# Patient Record
Sex: MALE | Race: WHITE | NOT HISPANIC OR LATINO | Employment: FULL TIME | URBAN - METROPOLITAN AREA
[De-identification: names, ages, dates, MRNs, and addresses within clinical notes are randomized per-mention and may not be internally consistent; named-entity substitution may affect disease eponyms.]

---

## 2021-08-17 ENCOUNTER — LAB VISIT (OUTPATIENT)
Dept: PRIMARY CARE CLINIC | Facility: OTHER | Age: 21
End: 2021-08-17
Payer: OTHER GOVERNMENT

## 2021-08-17 DIAGNOSIS — J02.9 SORE THROAT: ICD-10-CM

## 2021-08-17 PROCEDURE — U0003 INFECTIOUS AGENT DETECTION BY NUCLEIC ACID (DNA OR RNA); SEVERE ACUTE RESPIRATORY SYNDROME CORONAVIRUS 2 (SARS-COV-2) (CORONAVIRUS DISEASE [COVID-19]), AMPLIFIED PROBE TECHNIQUE, MAKING USE OF HIGH THROUGHPUT TECHNOLOGIES AS DESCRIBED BY CMS-2020-01-R: HCPCS | Performed by: INTERNAL MEDICINE

## 2021-08-19 LAB
SARS-COV-2 RNA RESP QL NAA+PROBE: NOT DETECTED
SARS-COV-2- CYCLE NUMBER: -1

## 2022-11-28 ENCOUNTER — OFFICE VISIT (OUTPATIENT)
Dept: FAMILY MEDICINE | Facility: CLINIC | Age: 22
End: 2022-11-28
Payer: OTHER GOVERNMENT

## 2022-11-28 VITALS
WEIGHT: 145 LBS | HEIGHT: 68 IN | BODY MASS INDEX: 21.98 KG/M2 | SYSTOLIC BLOOD PRESSURE: 122 MMHG | DIASTOLIC BLOOD PRESSURE: 71 MMHG | HEART RATE: 62 BPM

## 2022-11-28 DIAGNOSIS — K21.9 GASTROESOPHAGEAL REFLUX DISEASE, UNSPECIFIED WHETHER ESOPHAGITIS PRESENT: ICD-10-CM

## 2022-11-28 DIAGNOSIS — F33.1 MODERATE EPISODE OF RECURRENT MAJOR DEPRESSIVE DISORDER: ICD-10-CM

## 2022-11-28 DIAGNOSIS — Z23 FLU VACCINE NEED: ICD-10-CM

## 2022-11-28 DIAGNOSIS — Z00.00 ROUTINE PHYSICAL EXAMINATION: Primary | ICD-10-CM

## 2022-11-28 PROCEDURE — 99385 PREV VISIT NEW AGE 18-39: CPT | Mod: 25,S$GLB,, | Performed by: PHYSICIAN ASSISTANT

## 2022-11-28 PROCEDURE — 90471 IMMUNIZATION ADMIN: CPT | Mod: S$GLB,,, | Performed by: PHYSICIAN ASSISTANT

## 2022-11-28 PROCEDURE — 90682 RIV4 VACC RECOMBINANT DNA IM: CPT | Mod: S$GLB,,, | Performed by: PHYSICIAN ASSISTANT

## 2022-11-28 PROCEDURE — 99385 PR PREVENTIVE VISIT,NEW,18-39: ICD-10-PCS | Mod: 25,S$GLB,, | Performed by: PHYSICIAN ASSISTANT

## 2022-11-28 PROCEDURE — 90471 FLU VACCINE - QUADRIVALENT (RECOMBINANT) PRESERVATIVE FREE: ICD-10-PCS | Mod: S$GLB,,, | Performed by: PHYSICIAN ASSISTANT

## 2022-11-28 PROCEDURE — 90682 FLU VACCINE - QUADRIVALENT (RECOMBINANT) PRESERVATIVE FREE: ICD-10-PCS | Mod: S$GLB,,, | Performed by: PHYSICIAN ASSISTANT

## 2022-11-28 RX ORDER — ESCITALOPRAM OXALATE 5 MG/1
10 TABLET ORAL DAILY
Qty: 60 TABLET | Refills: 2 | Status: SHIPPED | OUTPATIENT
Start: 2022-11-28 | End: 2023-05-22 | Stop reason: SDUPTHER

## 2022-11-28 NOTE — PROGRESS NOTES
SUBJECTIVE:    Patient ID: Fercho Luna is a 22 y.o. male.    Chief Complaint: Establish Care (New patient to establish care//complains of depression, lack of appetite, insomnia, heart burn and recurrent indigestion//flu vaac ordered//tc)    This is a 22-year-old male who presents to establish care.  Past medical history significant for depression, indigestion and hives.  Past surgical history and family history have also been taken and reviewed in the chart. Reports that he has not been seen recently and does not have any updated labwork. Reports that he has had longstanding depressive symptoms pervasive through his adult life/ service. Stemming from a childhood trauma. Does not want to go into details with me but does think that he would be better able to open up to clinical psychologist on a regular basis.  Reports occasional lack of appetite, insomnia, GERD symptoms.  Mood somewhat depressed on a daily basis.  Occasionally finds it difficult to get out of bed and get moving.  Recent job in position that he likes and values has made this easier for him.  Reports being evaluated by a mental health provider through the  and being on Wellbutrin.  He stopped this after a few months at admits that he may not have given it proper time.      No visits with results within 6 Month(s) from this visit.   Latest known visit with results is:   Lab Visit on 08/17/2021   Component Date Value Ref Range Status    SARS-CoV2 (COVID-19) Qualitative P* 08/17/2021 Not Detected  Not Detected Final    SARS-COV-2- Cycle Number 08/17/2021 -1.00  Not Detected Final       Past Medical History:   Diagnosis Date    Depression     Hives     Persistent indigestion      Past Surgical History:   Procedure Laterality Date    APPENDECTOMY  11/28/2011     Family History   Problem Relation Age of Onset    Mental illness Mother     Insomnia Mother     Anxiety disorder Mother     Depression Mother     Breast cancer Mother     Heart  "disease Father     Schizophrenia Maternal Grandmother     Bipolar disorder Maternal Grandmother     Hypertension Maternal Grandfather     Alcohol abuse Maternal Grandfather     Alzheimer's disease Paternal Grandfather        Marital Status: Single  Alcohol History:  reports current alcohol use of about 1.0 standard drink per week.  Tobacco History:  reports that he has never smoked. He has never been exposed to tobacco smoke. He has never used smokeless tobacco.  Drug History:  reports current drug use. Frequency: 20.00 times per week. Drug: Marijuana.    Review of patient's allergies indicates:   Allergen Reactions    Amoxicillin Hives       Current Outpatient Medications:     EScitalopram oxalate (LEXAPRO) 5 MG Tab, Take 2 tablets (10 mg total) by mouth once daily., Disp: 60 tablet, Rfl: 2    Review of Systems   Constitutional:  Negative for activity change, fatigue, fever and unexpected weight change.   HENT:  Negative for congestion.    Respiratory:  Negative for apnea, cough, chest tightness and shortness of breath.    Cardiovascular:  Negative for chest pain and palpitations.   Gastrointestinal:  Negative for abdominal distention and abdominal pain.        GERD   Genitourinary:  Negative for difficulty urinating and dysuria.   Musculoskeletal:  Negative for arthralgias and back pain.   Neurological:  Negative for dizziness and weakness.   Psychiatric/Behavioral:  Positive for dysphoric mood and sleep disturbance. The patient is nervous/anxious.         Objective:      Vitals:    11/28/22 1110   BP: 122/71   Pulse: 62   Weight: 65.8 kg (145 lb)   Height: 5' 8" (1.727 m)     Physical Exam  Constitutional:       General: He is not in acute distress.     Appearance: He is well-developed.   HENT:      Head: Normocephalic and atraumatic.   Eyes:      Pupils: Pupils are equal, round, and reactive to light.   Neck:      Thyroid: No thyromegaly.   Cardiovascular:      Rate and Rhythm: Normal rate and regular rhythm.    "   Heart sounds: Normal heart sounds.   Pulmonary:      Effort: Pulmonary effort is normal.      Breath sounds: Normal breath sounds.   Abdominal:      General: Bowel sounds are normal. There is no distension.      Palpations: Abdomen is soft.      Tenderness: There is no abdominal tenderness.   Musculoskeletal:         General: Normal range of motion.      Cervical back: Normal range of motion and neck supple.   Skin:     General: Skin is warm and dry.      Findings: No erythema or rash.   Neurological:      Mental Status: He is alert and oriented to person, place, and time.      Cranial Nerves: No cranial nerve deficit.   Psychiatric:         Mood and Affect: Mood is depressed. Affect is blunt.         Behavior: Behavior is cooperative.         Thought Content: Thought content normal.         Cognition and Memory: Cognition and memory normal.         Judgment: Judgment normal.         Assessment:       1. Routine physical examination    2. Flu vaccine need    3. Moderate episode of recurrent major depressive disorder    4. Gastroesophageal reflux disease, unspecified whether esophagitis present           Plan:       Routine physical examination  Comments:  Labs today for ongoing patient care to get him established with our practice.  Follow-up in 4-6 weeks  Orders:  -     CBC Auto Differential; Future; Expected date: 11/28/2022  -     Comprehensive Metabolic Panel; Future; Expected date: 11/28/2022  -     Lipid Panel; Future; Expected date: 11/28/2022  -     TSH w/reflex to FT4; Future; Expected date: 11/28/2022  -     Urinalysis, Reflex to Urine Culture Urine, Clean Catch    Flu vaccine need  -     Influenza - Quadrivalent (Recombinant) (PF)    Moderate episode of recurrent major depressive disorder  Comments:  Patient agreeable to referral to Clinical Psychology for therapy sessions. will start low dose SSRI now and plan to f/u with labs in 4-6 weeks.  Orders:  -     EScitalopram oxalate (LEXAPRO) 5 MG Tab; Take  2 tablets (10 mg total) by mouth once daily.  Dispense: 60 tablet; Refill: 2  -     Ambulatory referral/consult to Psychiatry; Future; Expected date: 11/28/2022    Gastroesophageal reflux disease, unspecified whether esophagitis present  Comments:  Instructed to get over-the-counter omeprazole to take daily 1st thing in the morning.  Avoid spicy and irritating foods.  If need be we will get GI referral    Follow up in about 6 weeks (around 1/9/2023) for Depression follow-up.        11/28/2022 Wes Rojas PA-C

## 2022-12-05 ENCOUNTER — PATIENT MESSAGE (OUTPATIENT)
Dept: PSYCHIATRY | Facility: CLINIC | Age: 22
End: 2022-12-05

## 2022-12-15 ENCOUNTER — PATIENT MESSAGE (OUTPATIENT)
Dept: FAMILY MEDICINE | Facility: CLINIC | Age: 22
End: 2022-12-15

## 2022-12-15 DIAGNOSIS — K21.9 GASTROESOPHAGEAL REFLUX DISEASE, UNSPECIFIED WHETHER ESOPHAGITIS PRESENT: Primary | ICD-10-CM

## 2022-12-16 RX ORDER — OMEPRAZOLE 20 MG/1
20 CAPSULE, DELAYED RELEASE ORAL DAILY
Qty: 90 CAPSULE | Refills: 3 | Status: SHIPPED | OUTPATIENT
Start: 2022-12-16 | End: 2024-01-08

## 2023-01-10 ENCOUNTER — OFFICE VISIT (OUTPATIENT)
Dept: FAMILY MEDICINE | Facility: CLINIC | Age: 23
End: 2023-01-10
Payer: OTHER GOVERNMENT

## 2023-01-10 VITALS
HEART RATE: 75 BPM | DIASTOLIC BLOOD PRESSURE: 70 MMHG | BODY MASS INDEX: 21.49 KG/M2 | WEIGHT: 141.81 LBS | OXYGEN SATURATION: 96 % | HEIGHT: 68 IN | SYSTOLIC BLOOD PRESSURE: 110 MMHG

## 2023-01-10 DIAGNOSIS — F33.1 MODERATE EPISODE OF RECURRENT MAJOR DEPRESSIVE DISORDER: Primary | ICD-10-CM

## 2023-01-10 PROCEDURE — 99213 OFFICE O/P EST LOW 20 MIN: CPT | Mod: S$GLB,,, | Performed by: PHYSICIAN ASSISTANT

## 2023-01-10 PROCEDURE — 99213 PR OFFICE/OUTPT VISIT, EST, LEVL III, 20-29 MIN: ICD-10-PCS | Mod: S$GLB,,, | Performed by: PHYSICIAN ASSISTANT

## 2023-01-10 NOTE — PROGRESS NOTES
SUBJECTIVE:    Patient ID: Fercho Luna is a 22 y.o. male.    Chief Complaint: 6 week f/u (No bottles/Lexapro is still doing/BG)    This is a 22 year old male here for a 6 week follow up. History of depression, suicidal ideation he reports is much better than his previous visit. Reports the escitalopram was successful, suicidal ideations are significantly decreased from daily to 1-2x per week. Would like to continue trying to establish a psychologist. No new complaints at this time.      No visits with results within 6 Month(s) from this visit.   Latest known visit with results is:   Lab Visit on 08/17/2021   Component Date Value Ref Range Status    SARS-CoV2 (COVID-19) Qualitative P* 08/17/2021 Not Detected  Not Detected Final    SARS-COV-2- Cycle Number 08/17/2021 -1.00  Not Detected Final       Past Medical History:   Diagnosis Date    Depression     Hives     Persistent indigestion      Past Surgical History:   Procedure Laterality Date    APPENDECTOMY  11/28/2011     Family History   Problem Relation Age of Onset    Mental illness Mother     Insomnia Mother     Anxiety disorder Mother     Depression Mother     Breast cancer Mother     Heart disease Father     Schizophrenia Maternal Grandmother     Bipolar disorder Maternal Grandmother     Hypertension Maternal Grandfather     Alcohol abuse Maternal Grandfather     Alzheimer's disease Paternal Grandfather        Marital Status: Single  Alcohol History:  reports current alcohol use of about 1.0 standard drink per week.  Tobacco History:  reports that he has never smoked. He has never been exposed to tobacco smoke. He has never used smokeless tobacco.  Drug History:  reports current drug use. Frequency: 20.00 times per week. Drug: Marijuana.    Review of patient's allergies indicates:   Allergen Reactions    Amoxicillin Hives       Current Outpatient Medications:     EScitalopram oxalate (LEXAPRO) 5 MG Tab, Take 2 tablets (10 mg total) by mouth once daily., Disp:  "60 tablet, Rfl: 2    omeprazole (PRILOSEC) 20 MG capsule, Take 1 capsule (20 mg total) by mouth once daily., Disp: 90 capsule, Rfl: 3    Review of Systems   Constitutional:  Negative for activity change, fatigue, fever and unexpected weight change.   HENT:  Positive for congestion.    Respiratory:  Negative for apnea, cough, chest tightness and shortness of breath.    Cardiovascular:  Negative for chest pain and palpitations.   Gastrointestinal:  Negative for abdominal distention and abdominal pain.   Genitourinary:  Negative for difficulty urinating and dysuria.   Musculoskeletal:  Negative for arthralgias and back pain.   Neurological:  Negative for dizziness and weakness.        Objective:      Vitals:    01/10/23 1542   BP: 110/70   Pulse: 75   SpO2: 96%   Weight: 64.3 kg (141 lb 12.8 oz)   Height: 5' 8" (1.727 m)     Physical Exam  Constitutional:       General: He is not in acute distress.     Appearance: He is well-developed.   HENT:      Head: Normocephalic and atraumatic.   Eyes:      Pupils: Pupils are equal, round, and reactive to light.   Neck:      Thyroid: No thyromegaly.   Cardiovascular:      Rate and Rhythm: Normal rate and regular rhythm.      Heart sounds: Normal heart sounds.   Pulmonary:      Effort: Pulmonary effort is normal.      Breath sounds: Normal breath sounds.   Abdominal:      General: Bowel sounds are normal. There is no distension.      Palpations: Abdomen is soft.      Tenderness: There is no abdominal tenderness.   Musculoskeletal:         General: Normal range of motion.      Cervical back: Normal range of motion and neck supple.   Skin:     General: Skin is warm and dry.      Findings: No erythema or rash.   Neurological:      Mental Status: He is alert and oriented to person, place, and time.      Cranial Nerves: No cranial nerve deficit.         Assessment:       1. Moderate episode of recurrent major depressive disorder         Plan:       Moderate episode of recurrent major " depressive disorder  Comments:  Doing well on low-dose Lexapro.  Will continue current dose as is.  Working on getting him in with Clinical psychologist here in [facility] for therapy      No follow-ups on file.        1/10/2023 Wes Rojas PA-C

## 2023-01-21 ENCOUNTER — PATIENT MESSAGE (OUTPATIENT)
Dept: FAMILY MEDICINE | Facility: CLINIC | Age: 23
End: 2023-01-21

## 2023-01-21 DIAGNOSIS — F33.1 MODERATE EPISODE OF RECURRENT MAJOR DEPRESSIVE DISORDER: Primary | ICD-10-CM

## 2023-02-08 ENCOUNTER — TELEPHONE (OUTPATIENT)
Dept: PSYCHIATRY | Facility: CLINIC | Age: 23
End: 2023-02-08

## 2023-05-22 ENCOUNTER — TELEPHONE (OUTPATIENT)
Dept: FAMILY MEDICINE | Facility: CLINIC | Age: 23
End: 2023-05-22

## 2023-05-22 DIAGNOSIS — F33.1 MODERATE EPISODE OF RECURRENT MAJOR DEPRESSIVE DISORDER: ICD-10-CM

## 2023-05-22 RX ORDER — ESCITALOPRAM OXALATE 5 MG/1
10 TABLET ORAL DAILY
Qty: 60 TABLET | Refills: 2 | Status: SHIPPED | OUTPATIENT
Start: 2023-05-22 | End: 2023-07-10 | Stop reason: SDUPTHER

## 2023-05-22 NOTE — TELEPHONE ENCOUNTER
----- Message from Wilton Wood MA sent at 5/22/2023  3:01 PM CDT -----  Regarding: refill  Pt calling to [facility] [phone]

## 2023-05-22 NOTE — TELEPHONE ENCOUNTER
----- Message from Yoselyn Sotelo sent at 5/22/2023  1:56 PM CDT -----  Vm- pt needs refill on lexapro today, [place]   [phone]

## 2023-06-09 ENCOUNTER — PATIENT MESSAGE (OUTPATIENT)
Dept: FAMILY MEDICINE | Facility: CLINIC | Age: 23
End: 2023-06-09

## 2023-06-09 ENCOUNTER — TELEPHONE (OUTPATIENT)
Dept: PSYCHIATRY | Facility: CLINIC | Age: 23
End: 2023-06-09
Payer: MEDICAID

## 2023-06-09 NOTE — TELEPHONE ENCOUNTER
Pt called requesting to schedule appt. He states he wants to see Dr. Grant for therapy as he was referred. Advised pt that Dr. Grant is no longer doing therapy, he has transitioned to med management. Offered to place pt on wait list for therapy and explained that we currently have an extensive wait list. Also offered to send resource list via portal. Pt refused both - does not want to be placed on wait list and does not want resource list. He states he will contact PCP.

## 2023-06-22 ENCOUNTER — TELEPHONE (OUTPATIENT)
Dept: FAMILY MEDICINE | Facility: CLINIC | Age: 23
End: 2023-06-22

## 2023-07-10 ENCOUNTER — OFFICE VISIT (OUTPATIENT)
Dept: FAMILY MEDICINE | Facility: CLINIC | Age: 23
End: 2023-07-10
Payer: MEDICAID

## 2023-07-10 VITALS
WEIGHT: 145 LBS | BODY MASS INDEX: 21.98 KG/M2 | HEIGHT: 68 IN | HEART RATE: 76 BPM | DIASTOLIC BLOOD PRESSURE: 70 MMHG | SYSTOLIC BLOOD PRESSURE: 106 MMHG

## 2023-07-10 DIAGNOSIS — Z00.00 ROUTINE PHYSICAL EXAMINATION: Primary | ICD-10-CM

## 2023-07-10 DIAGNOSIS — F33.1 MODERATE EPISODE OF RECURRENT MAJOR DEPRESSIVE DISORDER: ICD-10-CM

## 2023-07-10 PROCEDURE — 3078F DIAST BP <80 MM HG: CPT | Mod: CPTII,S$GLB,, | Performed by: PHYSICIAN ASSISTANT

## 2023-07-10 PROCEDURE — 99214 OFFICE O/P EST MOD 30 MIN: CPT | Mod: S$GLB,,, | Performed by: PHYSICIAN ASSISTANT

## 2023-07-10 PROCEDURE — 3008F BODY MASS INDEX DOCD: CPT | Mod: CPTII,S$GLB,, | Performed by: PHYSICIAN ASSISTANT

## 2023-07-10 PROCEDURE — 3078F PR MOST RECENT DIASTOLIC BLOOD PRESSURE < 80 MM HG: ICD-10-PCS | Mod: CPTII,S$GLB,, | Performed by: PHYSICIAN ASSISTANT

## 2023-07-10 PROCEDURE — 1159F PR MEDICATION LIST DOCUMENTED IN MEDICAL RECORD: ICD-10-PCS | Mod: CPTII,S$GLB,, | Performed by: PHYSICIAN ASSISTANT

## 2023-07-10 PROCEDURE — 99214 PR OFFICE/OUTPT VISIT, EST, LEVL IV, 30-39 MIN: ICD-10-PCS | Mod: S$GLB,,, | Performed by: PHYSICIAN ASSISTANT

## 2023-07-10 PROCEDURE — 1159F MED LIST DOCD IN RCRD: CPT | Mod: CPTII,S$GLB,, | Performed by: PHYSICIAN ASSISTANT

## 2023-07-10 PROCEDURE — 3074F PR MOST RECENT SYSTOLIC BLOOD PRESSURE < 130 MM HG: ICD-10-PCS | Mod: CPTII,S$GLB,, | Performed by: PHYSICIAN ASSISTANT

## 2023-07-10 PROCEDURE — 3074F SYST BP LT 130 MM HG: CPT | Mod: CPTII,S$GLB,, | Performed by: PHYSICIAN ASSISTANT

## 2023-07-10 PROCEDURE — 3008F PR BODY MASS INDEX (BMI) DOCUMENTED: ICD-10-PCS | Mod: CPTII,S$GLB,, | Performed by: PHYSICIAN ASSISTANT

## 2023-07-10 RX ORDER — ESCITALOPRAM OXALATE 10 MG/1
10 TABLET ORAL DAILY
Qty: 90 TABLET | Refills: 1 | Status: SHIPPED | OUTPATIENT
Start: 2023-07-10 | End: 2024-01-08 | Stop reason: SDUPTHER

## 2023-07-10 NOTE — PROGRESS NOTES
SUBJECTIVE:    Patient ID: Fercho Luna is a 23 y.o. male.    Chief Complaint: Follow-up (6mth, went over meds verbally// SW)    This is a 23-year-old male who presents today for six-month follow-up. History of depression, suicidal ideation he reports is much better than his previous. 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 Liking the job. Keeping him busy. Meds are effective.. Was not able to get in with psych but feels like he os doing fine.      No visits with results within 6 Month(s) from this visit.   Latest known visit with results is:   Lab Visit on 08/17/2021   Component Date Value Ref Range Status    SARS-CoV2 (COVID-19) Qualitative P* 08/17/2021 Not Detected  Not Detected Final    SARS-COV-2- Cycle Number 08/17/2021 -1.00  Not Detected Final       Past Medical History:   Diagnosis Date    Depression     Hives     Persistent indigestion      Past Surgical History:   Procedure Laterality Date    APPENDECTOMY  11/28/2011     Family History   Problem Relation Age of Onset    Mental illness Mother     Insomnia Mother     Anxiety disorder Mother     Depression Mother     Breast cancer Mother     Heart disease Father     Schizophrenia Maternal Grandmother     Bipolar disorder Maternal Grandmother     Hypertension Maternal Grandfather     Alcohol abuse Maternal Grandfather     Alzheimer's disease Paternal Grandfather        Marital Status: Single  Alcohol History:  reports current alcohol use of about 1.0 standard drink per week.  Tobacco History:  reports that he has never smoked. He has never been exposed to tobacco smoke. He has never used smokeless tobacco.  Drug History:  reports current drug use. Frequency: 20.00 times per week. Drug: Marijuana.    Review of patient's allergies indicates:   Allergen Reactions    Amoxicillin Hives       Current Outpatient Medications:     omeprazole (PRILOSEC) 20 MG capsule, Take 1 capsule (20 mg total) by mouth once daily., Disp: 90 capsule, Rfl: 3     "EScitalopram oxalate (LEXAPRO) 10 MG tablet, Take 1 tablet (10 mg total) by mouth once daily., Disp: 90 tablet, Rfl: 1    Review of Systems   Constitutional:  Negative for activity change, fatigue, fever and unexpected weight change.   HENT:  Negative for congestion.    Respiratory:  Negative for apnea, cough, chest tightness and shortness of breath.    Cardiovascular:  Negative for chest pain and palpitations.   Gastrointestinal:  Negative for abdominal distention and abdominal pain.   Genitourinary:  Negative for difficulty urinating and dysuria.   Musculoskeletal:  Negative for arthralgias and back pain.   Neurological:  Negative for dizziness and weakness.        Objective:      Vitals:    07/10/23 0850   BP: 106/70   Pulse: 76   Weight: 65.8 kg (145 lb)   Height: 5' 8" (1.727 m)     Physical Exam  Constitutional:       General: He is not in acute distress.     Appearance: He is well-developed.   HENT:      Head: Normocephalic and atraumatic.   Eyes:      Pupils: Pupils are equal, round, and reactive to light.   Neck:      Thyroid: No thyromegaly.   Cardiovascular:      Rate and Rhythm: Normal rate and regular rhythm.      Heart sounds: Normal heart sounds.   Pulmonary:      Effort: Pulmonary effort is normal.      Breath sounds: Normal breath sounds.   Abdominal:      General: Bowel sounds are normal. There is no distension.      Palpations: Abdomen is soft.      Tenderness: There is no abdominal tenderness.   Musculoskeletal:         General: Normal range of motion.      Cervical back: Normal range of motion and neck supple.   Skin:     General: Skin is warm and dry.      Findings: No erythema or rash.   Neurological:      Mental Status: He is alert and oriented to person, place, and time.      Cranial Nerves: No cranial nerve deficit.         Assessment:       1. Routine physical examination    2. Moderate episode of recurrent major depressive disorder         Plan:       Routine physical " examination  Comments:  Labs today for ongoing pt care.    Moderate episode of recurrent major depressive disorder  Comments:  Patient agreeable to referral to Clinical Psychology for therapy sessions. Doing well with low dose SSRI. refills today.  Orders:  -     EScitalopram oxalate (LEXAPRO) 10 MG tablet; Take 1 tablet (10 mg total) by mouth once daily.  Dispense: 90 tablet; Refill: 1      Follow up in about 6 months (around 1/10/2024).        7/10/2023 Wes Rojas PA-C

## 2023-07-11 LAB
ALBUMIN SERPL-MCNC: 4.5 G/DL (ref 3.6–5.1)
ALBUMIN/GLOB SERPL: 1.6 (CALC) (ref 1–2.5)
ALP SERPL-CCNC: 59 U/L (ref 36–130)
ALT SERPL-CCNC: 17 U/L (ref 9–46)
AST SERPL-CCNC: 13 U/L (ref 10–40)
BASOPHILS # BLD AUTO: 60 CELLS/UL (ref 0–200)
BASOPHILS NFR BLD AUTO: 1.2 %
BILIRUB SERPL-MCNC: 0.5 MG/DL (ref 0.2–1.2)
BUN SERPL-MCNC: 20 MG/DL (ref 7–25)
BUN/CREAT SERPL: NORMAL (CALC) (ref 6–22)
CALCIUM SERPL-MCNC: 9.5 MG/DL (ref 8.6–10.3)
CHLORIDE SERPL-SCNC: 103 MMOL/L (ref 98–110)
CHOLEST SERPL-MCNC: 165 MG/DL
CHOLEST/HDLC SERPL: 3.2 (CALC)
CO2 SERPL-SCNC: 29 MMOL/L (ref 20–32)
CREAT SERPL-MCNC: 1.04 MG/DL (ref 0.6–1.24)
EGFR: 103 ML/MIN/1.73M2
EOSINOPHIL # BLD AUTO: 170 CELLS/UL (ref 15–500)
EOSINOPHIL NFR BLD AUTO: 3.4 %
ERYTHROCYTE [DISTWIDTH] IN BLOOD BY AUTOMATED COUNT: 12.4 % (ref 11–15)
GLOBULIN SER CALC-MCNC: 2.8 G/DL (CALC) (ref 1.9–3.7)
GLUCOSE SERPL-MCNC: 89 MG/DL (ref 65–99)
HCT VFR BLD AUTO: 48.7 % (ref 38.5–50)
HDLC SERPL-MCNC: 52 MG/DL
HGB BLD-MCNC: 16.8 G/DL (ref 13.2–17.1)
LDLC SERPL CALC-MCNC: 97 MG/DL (CALC)
LYMPHOCYTES # BLD AUTO: 1855 CELLS/UL (ref 850–3900)
LYMPHOCYTES NFR BLD AUTO: 37.1 %
MCH RBC QN AUTO: 30.7 PG (ref 27–33)
MCHC RBC AUTO-ENTMCNC: 34.5 G/DL (ref 32–36)
MCV RBC AUTO: 88.9 FL (ref 80–100)
MONOCYTES # BLD AUTO: 440 CELLS/UL (ref 200–950)
MONOCYTES NFR BLD AUTO: 8.8 %
NEUTROPHILS # BLD AUTO: 2475 CELLS/UL (ref 1500–7800)
NEUTROPHILS NFR BLD AUTO: 49.5 %
NONHDLC SERPL-MCNC: 113 MG/DL (CALC)
PLATELET # BLD AUTO: 267 THOUSAND/UL (ref 140–400)
PMV BLD REES-ECKER: 10.2 FL (ref 7.5–12.5)
POTASSIUM SERPL-SCNC: 4.6 MMOL/L (ref 3.5–5.3)
PROT SERPL-MCNC: 7.3 G/DL (ref 6.1–8.1)
RBC # BLD AUTO: 5.48 MILLION/UL (ref 4.2–5.8)
SODIUM SERPL-SCNC: 139 MMOL/L (ref 135–146)
TRIGL SERPL-MCNC: 69 MG/DL
TSH SERPL-ACNC: 1.69 MIU/L (ref 0.4–4.5)
WBC # BLD AUTO: 5 THOUSAND/UL (ref 3.8–10.8)

## 2023-07-12 ENCOUNTER — PATIENT MESSAGE (OUTPATIENT)
Dept: FAMILY MEDICINE | Facility: CLINIC | Age: 23
End: 2023-07-12

## 2023-07-13 ENCOUNTER — PATIENT MESSAGE (OUTPATIENT)
Dept: FAMILY MEDICINE | Facility: CLINIC | Age: 23
End: 2023-07-13

## 2023-07-13 DIAGNOSIS — M54.9 UPPER BACK PAIN: Primary | ICD-10-CM

## 2023-07-13 NOTE — TELEPHONE ENCOUNTER
Patient is agreeable to this we can start physical therapy.  Sounds like he is had somewhat of a workup with x-rays and Chiropractic efforts few years ago.  If symptoms persist after dedicated physical therapy would need to consider further imaging

## 2023-07-20 ENCOUNTER — PATIENT MESSAGE (OUTPATIENT)
Dept: FAMILY MEDICINE | Facility: CLINIC | Age: 23
End: 2023-07-20

## 2023-07-21 ENCOUNTER — PATIENT MESSAGE (OUTPATIENT)
Dept: FAMILY MEDICINE | Facility: CLINIC | Age: 23
End: 2023-07-21

## 2023-07-21 NOTE — TELEPHONE ENCOUNTER
PT recommended to evaluate further, If no significant improvement with PT then we will look into imaging. I dont think he had any specific injury with this correct? And he didn't mention any sxs at the OV last week...

## 2023-07-24 ENCOUNTER — PATIENT MESSAGE (OUTPATIENT)
Dept: FAMILY MEDICINE | Facility: CLINIC | Age: 23
End: 2023-07-24

## 2023-08-20 ENCOUNTER — PATIENT MESSAGE (OUTPATIENT)
Dept: FAMILY MEDICINE | Facility: CLINIC | Age: 23
End: 2023-08-20

## 2023-08-20 DIAGNOSIS — F90.9 ATTENTION DEFICIT HYPERACTIVITY DISORDER (ADHD), UNSPECIFIED ADHD TYPE: Primary | ICD-10-CM

## 2023-10-10 ENCOUNTER — PATIENT MESSAGE (OUTPATIENT)
Dept: FAMILY MEDICINE | Facility: CLINIC | Age: 23
End: 2023-10-10

## 2023-10-11 RX ORDER — NAPROXEN 500 MG/1
500 TABLET ORAL 2 TIMES DAILY
Qty: 14 TABLET | Refills: 0 | Status: SHIPPED | OUTPATIENT
Start: 2023-10-11 | End: 2023-10-18

## 2023-10-11 RX ORDER — TIZANIDINE 4 MG/1
4 TABLET ORAL EVERY 6 HOURS PRN
Qty: 30 TABLET | Refills: 0 | Status: SHIPPED | OUTPATIENT
Start: 2023-10-11 | End: 2023-10-21

## 2023-10-11 NOTE — TELEPHONE ENCOUNTER
Spoke to patient per Ayo. Verbalized asking patient , tizanidine 4mg po q4-6 hours, naproxen 500 BID scheduled for a week. Heat/ice applied to neck/back. Patient verbalized understanding. Agreed to take medication.     Rx set up.   over 75 yrs

## 2023-10-11 NOTE — TELEPHONE ENCOUNTER
If he agrees tizanidine 4mg po q4-6 hours, can use naproxen 500 BID scheduled for a week. Heat/ice applied to neck/back. Disp #30 with the tizanidine

## 2024-01-08 ENCOUNTER — HOSPITAL ENCOUNTER (OUTPATIENT)
Dept: RADIOLOGY | Facility: HOSPITAL | Age: 24
Discharge: HOME | End: 2024-01-08
Attending: PHYSICIAN ASSISTANT
Payer: MEDICAID

## 2024-01-08 ENCOUNTER — OFFICE VISIT (OUTPATIENT)
Dept: FAMILY MEDICINE | Facility: CLINIC | Age: 24
End: 2024-01-08
Payer: MEDICAID

## 2024-01-08 VITALS
SYSTOLIC BLOOD PRESSURE: 110 MMHG | BODY MASS INDEX: 22.88 KG/M2 | OXYGEN SATURATION: 99 % | WEIGHT: 151 LBS | HEIGHT: 68 IN | HEART RATE: 89 BPM | DIASTOLIC BLOOD PRESSURE: 72 MMHG

## 2024-01-08 DIAGNOSIS — S16.1XXA STRAIN OF NECK MUSCLE, INITIAL ENCOUNTER: ICD-10-CM

## 2024-01-08 DIAGNOSIS — S16.1XXA STRAIN OF NECK MUSCLE, INITIAL ENCOUNTER: Primary | ICD-10-CM

## 2024-01-08 DIAGNOSIS — F33.1 MODERATE EPISODE OF RECURRENT MAJOR DEPRESSIVE DISORDER: ICD-10-CM

## 2024-01-08 PROCEDURE — 72040 X-RAY EXAM NECK SPINE 2-3 VW: CPT | Mod: TC,PO

## 2024-01-08 PROCEDURE — 3074F SYST BP LT 130 MM HG: CPT | Mod: CPTII,S$GLB,, | Performed by: PHYSICIAN ASSISTANT

## 2024-01-08 PROCEDURE — 3078F DIAST BP <80 MM HG: CPT | Mod: CPTII,S$GLB,, | Performed by: PHYSICIAN ASSISTANT

## 2024-01-08 PROCEDURE — 99214 OFFICE O/P EST MOD 30 MIN: CPT | Mod: S$GLB,,, | Performed by: PHYSICIAN ASSISTANT

## 2024-01-08 PROCEDURE — 3008F BODY MASS INDEX DOCD: CPT | Mod: CPTII,S$GLB,, | Performed by: PHYSICIAN ASSISTANT

## 2024-01-08 PROCEDURE — 1159F MED LIST DOCD IN RCRD: CPT | Mod: CPTII,S$GLB,, | Performed by: PHYSICIAN ASSISTANT

## 2024-01-08 RX ORDER — CETIRIZINE HYDROCHLORIDE 10 MG/1
10 TABLET ORAL DAILY
COMMUNITY

## 2024-01-08 RX ORDER — ESCITALOPRAM OXALATE 10 MG/1
10 TABLET ORAL DAILY
Qty: 90 TABLET | Refills: 1 | Status: SHIPPED | OUTPATIENT
Start: 2024-01-08 | End: 2024-07-06

## 2024-01-08 NOTE — PROGRESS NOTES
SUBJECTIVE:    Patient ID: Fercho Luna is a 23 y.o. male.    Chief Complaint: 6M Check Up and c/o chronic back pain (-constant tightness, sometimes progresses to back spasms)    This is a 23-year-old male who presents today for six-month checkup.  He remains on low-dose Lexapro for anxiety/depression.  Ultimately not able to see Clinical Psychology but doing well with medication.  Also just treated for GERD.    Does also report continued upper back pain. Neck and shoulder blades. Massages have not helped. Upper back feels tight and spasming at times. Initially started in the band a few years ago. Work now has exacerbated it. Movement brings it on. 2016ish. PT ordered previously but never completed. Denies any numbness/tingling UE. NO weakness.         No visits with results within 6 Month(s) from this visit.   Latest known visit with results is:   Office Visit on 11/28/2022   Component Date Value Ref Range Status    WBC 07/10/2023 5.0  3.8 - 10.8 Thousand/uL Final    RBC 07/10/2023 5.48  4.20 - 5.80 Million/uL Final    Hemoglobin 07/10/2023 16.8  13.2 - 17.1 g/dL Final    Hematocrit 07/10/2023 48.7  38.5 - 50.0 % Final    MCV 07/10/2023 88.9  80.0 - 100.0 fL Final    MCH 07/10/2023 30.7  27.0 - 33.0 pg Final    MCHC 07/10/2023 34.5  32.0 - 36.0 g/dL Final    RDW 07/10/2023 12.4  11.0 - 15.0 % Final    Platelets 07/10/2023 267  140 - 400 Thousand/uL Final    MPV 07/10/2023 10.2  7.5 - 12.5 fL Final    Neutrophils, Abs 07/10/2023 2,475  1,500 - 7,800 cells/uL Final    Lymph # 07/10/2023 1,855  850 - 3,900 cells/uL Final    Mono # 07/10/2023 440  200 - 950 cells/uL Final    Eos # 07/10/2023 170  15 - 500 cells/uL Final    Baso # 07/10/2023 60  0 - 200 cells/uL Final    Neutrophils Relative 07/10/2023 49.5  % Final    Lymph % 07/10/2023 37.1  % Final    Mono % 07/10/2023 8.8  % Final    Eosinophil % 07/10/2023 3.4  % Final    Basophil % 07/10/2023 1.2  % Final    Glucose 07/10/2023 89  65 - 99 mg/dL Final    BUN  07/10/2023 20  7 - 25 mg/dL Final    Creatinine 07/10/2023 1.04  0.60 - 1.24 mg/dL Final    eGFR 07/10/2023 103  > OR = 60 mL/min/1.73m2 Final    BUN/Creatinine Ratio 07/10/2023 NOT APPLICABLE  6 - 22 (calc) Final    Sodium 07/10/2023 139  135 - 146 mmol/L Final    Potassium 07/10/2023 4.6  3.5 - 5.3 mmol/L Final    Chloride 07/10/2023 103  98 - 110 mmol/L Final    CO2 07/10/2023 29  20 - 32 mmol/L Final    Calcium 07/10/2023 9.5  8.6 - 10.3 mg/dL Final    Total Protein 07/10/2023 7.3  6.1 - 8.1 g/dL Final    Albumin 07/10/2023 4.5  3.6 - 5.1 g/dL Final    Globulin, Total 07/10/2023 2.8  1.9 - 3.7 g/dL (calc) Final    Albumin/Globulin Ratio 07/10/2023 1.6  1.0 - 2.5 (calc) Final    Total Bilirubin 07/10/2023 0.5  0.2 - 1.2 mg/dL Final    Alkaline Phosphatase 07/10/2023 59  36 - 130 U/L Final    AST 07/10/2023 13  10 - 40 U/L Final    ALT 07/10/2023 17  9 - 46 U/L Final    Cholesterol 07/10/2023 165  <200 mg/dL Final    HDL 07/10/2023 52  > OR = 40 mg/dL Final    Triglycerides 07/10/2023 69  <150 mg/dL Final    LDL Cholesterol 07/10/2023 97  mg/dL (calc) Final    HDL/Cholesterol Ratio 07/10/2023 3.2  <5.0 (calc) Final    Non HDL Chol. (LDL+VLDL) 07/10/2023 113  <130 mg/dL (calc) Final    TSH w/reflex to FT4 07/10/2023 1.69  0.40 - 4.50 mIU/L Final       Past Medical History:   Diagnosis Date    Depression     Hives     Persistent indigestion      Past Surgical History:   Procedure Laterality Date    APPENDECTOMY  11/28/2011     Family History   Problem Relation Age of Onset    Mental illness Mother     Insomnia Mother     Anxiety disorder Mother     Depression Mother     Breast cancer Mother     Heart disease Father     Schizophrenia Maternal Grandmother     Bipolar disorder Maternal Grandmother     Hypertension Maternal Grandfather     Alcohol abuse Maternal Grandfather     Alzheimer's disease Paternal Grandfather        Marital Status: Single  Alcohol History:  reports current alcohol use of about 1.0 standard  "drink of alcohol per week.  Tobacco History:  reports that he has never smoked. He has never been exposed to tobacco smoke. He has never used smokeless tobacco.  Drug History:  reports current drug use. Frequency: 20.00 times per week. Drug: Marijuana.    Review of patient's allergies indicates:   Allergen Reactions    Amoxicillin Hives       Current Outpatient Medications:     cetirizine (ZYRTEC) 10 MG tablet, Take 10 mg by mouth once daily., Disp: , Rfl:     EScitalopram oxalate (LEXAPRO) 10 MG tablet, Take 1 tablet (10 mg total) by mouth once daily., Disp: 90 tablet, Rfl: 1    Review of Systems   Constitutional:  Positive for unexpected weight change. Negative for activity change.   HENT:  Negative for hearing loss, rhinorrhea and trouble swallowing.    Eyes:  Negative for discharge and visual disturbance.   Respiratory:  Negative for chest tightness and wheezing.    Cardiovascular:  Negative for chest pain and palpitations.   Gastrointestinal:  Negative for blood in stool, constipation, diarrhea and vomiting.   Endocrine: Negative for polydipsia and polyuria.   Genitourinary:  Negative for difficulty urinating, hematuria and urgency.   Musculoskeletal:  Negative for arthralgias, joint swelling and neck pain.   Neurological:  Positive for headaches. Negative for weakness.   Psychiatric/Behavioral:  Positive for dysphoric mood. Negative for confusion.           Objective:      Vitals:    01/08/24 0859   BP: 110/72   Pulse: 89   SpO2: 99%   Weight: 68.5 kg (151 lb)   Height: 5' 8" (1.727 m)     Physical Exam  Constitutional:       General: He is not in acute distress.     Appearance: He is well-developed.   HENT:      Head: Normocephalic and atraumatic.   Eyes:      Pupils: Pupils are equal, round, and reactive to light.   Neck:      Thyroid: No thyromegaly.   Cardiovascular:      Rate and Rhythm: Normal rate and regular rhythm.      Heart sounds: Normal heart sounds.   Pulmonary:      Effort: Pulmonary effort is " normal.      Breath sounds: Normal breath sounds.   Abdominal:      General: Bowel sounds are normal. There is no distension.      Palpations: Abdomen is soft.      Tenderness: There is no abdominal tenderness.   Musculoskeletal:         General: Normal range of motion.      Cervical back: Normal range of motion and neck supple.   Skin:     General: Skin is warm and dry.      Findings: No erythema or rash.   Neurological:      Mental Status: He is alert and oriented to person, place, and time.      Cranial Nerves: No cranial nerve deficit.           Assessment:       1. Strain of neck muscle, initial encounter    2. Moderate episode of recurrent major depressive disorder         Plan:       Strain of neck muscle, initial encounter  Comments:  plain films today. referral for PT. Plan to f/u in office 8-12 weeks.  Orders:  -     Ambulatory referral/consult to Physical/Occupational Therapy; Future; Expected date: 01/08/2024  -     X-Ray Cervical Spine 2 or 3 Views; Future; Expected date: 01/08/2024    Moderate episode of recurrent major depressive disorder  Comments:  Doing well, will maintain as is. refills today.  Orders:  -     EScitalopram oxalate (LEXAPRO) 10 MG tablet; Take 1 tablet (10 mg total) by mouth once daily.  Dispense: 90 tablet; Refill: 1      Follow up in about 6 months (around 7/8/2024).        1/8/2024 Wes Rojas PA-C

## 2024-01-11 ENCOUNTER — CLINICAL SUPPORT (OUTPATIENT)
Dept: REHABILITATION | Facility: HOSPITAL | Age: 24
End: 2024-01-11
Payer: MEDICAID

## 2024-01-11 DIAGNOSIS — M54.6 CHRONIC BILATERAL THORACIC BACK PAIN: Primary | ICD-10-CM

## 2024-01-11 DIAGNOSIS — M53.84 DECREASED RANGE OF MOTION OF THORACIC SPINE: ICD-10-CM

## 2024-01-11 DIAGNOSIS — S16.1XXA STRAIN OF NECK MUSCLE, INITIAL ENCOUNTER: ICD-10-CM

## 2024-01-11 DIAGNOSIS — G89.29 CHRONIC BILATERAL THORACIC BACK PAIN: Primary | ICD-10-CM

## 2024-01-11 PROCEDURE — 97161 PT EVAL LOW COMPLEX 20 MIN: CPT | Mod: PN

## 2024-01-11 PROCEDURE — 97110 THERAPEUTIC EXERCISES: CPT | Mod: PN

## 2024-01-11 NOTE — PLAN OF CARE
[facility]  Physical Therapy Initial Evaluation    Name: Fercho Bill  Clinic Number: [id number]    Therapy Diagnosis:  Encounter Diagnoses   Name Primary?    Strain of neck muscle, initial encounter     Chronic bilateral thoracic back pain Yes    Decreased range of motion of thoracic spine       Physician: Wes Rojas PA*    Physician Orders: PT Eval and Treat  Medical Diagnosis from Referral: S16.1XXA (ICD-10-CM) - Strain of neck muscle, initial encounter   Evaluation Date: 1/11/2024  Authorization Period Expiration: 12/31/24  Plan of Care Expiration: 2/28/24    Progress Update: 2/14/24    Visit # / Visits authorized: 1 / 6    FOTO: Visit #1 - 1/3     PRECAUTIONS: Standard Precautions     MD Follow-up: /2023    Time In: 1000  Time Out: 1100  Total Appointment Time (timed & untimed codes): 60 minutes    SUBJECTIVE     Date of onset: 2016    History of current condition - Fercho is a 23 y.o. male whom reports Mid back and Upper Trap pain causes spasms.  Think it started with band when he was in high school.  He has had pain in his mid and upper back since, not relieved with any treatments  Prior treatment includes medication, no other treatments.   Fercho's current exercise  regiment includes: none.  Seeking Physical Therapy for less pain in his mid back .    RAZA: Gradual   Falls: none  Physician Instructions (per patient): none  Other concerns: none    Imaging: X-RAY:     Prior Therapy: N/A  Social History: Pt lives with their family  Living Environment: 1 story   ADLs unable to complete: None just increases pain and take longer   Gym/Home Equipment: none  Occupation: Pt is No issues with work   Prior Level of Function: Independent with all ADLs    Pain:  Current 3 /10, worst 10 /10, best 2 /10   Location: Mid back and Bilateral Upper Trap   Description: Aching, Throbbing, Tight, and Deep  Aggravating Factors: Twisting and reaching   Easing Factors: rest     Pts goals: Pt reported  goals are less back pain .     _______________________________________________________  Medical History:   Past Medical History:   Diagnosis Date    Depression     Hives     Persistent indigestion        Surgical History:   Fercho Bill  has a past surgical history that includes Appendectomy (11/28/2011).    Medications:   Fercho has a current medication list which includes the following prescription(s): cetirizine and escitalopram oxalate.    Allergies:   Review of patient's allergies indicates:   Allergen Reactions    Amoxicillin Hives        OBJECTIVE     RANGE OF MOTION:    Cervical Right  (spine) Left     Pain/Dysfunction with Movement Goal   Cervical Flexion (60) 50    45     Cervical Extension (90) 70    45     Cervical Side Bending (45) 30 30  45     Cervical Rotation (75) 60 60  60           NEURO SCREEN:    Neuro Testing Right   Left   Details   Reflexes  Not tested  Not tested     Dermatomes normal normal    Myotomes normal normal    Tone normal normal    Spasticity Not present Not present        FUNCTIONAL SCREEN:    Upper Extremity ROM Details STRENGTH Details   Shoulder WNL  No pain or discomfort Grossly 4+/5 No pain or discomfort   Elbow WNL No Pain or discomfort Grossly 5/5 No pain or discomfort   Hand/Wrist WNL No pain or discomfort Grossly 5/5 No pain or discomfort         JOINT MOBILITY:     Joint Motion Tested Right  (spine)   Left    Goal   Cervical Mobility: C1-2 Normal Normal Normal B   Cervical Moiblity: C3-7 Hypomobile Hypomobile Normal B   Thoracic Mobility: T1-12 Hypomobile Hypomobile Normal B     SPECIAL TESTS:     Right  (spine)   Left    Goal   Compression Negative Negative Negative B    Spurlings  Negative Negative Negative B    Disatraction Negative Negative Negative B      Sensation:  Sensation is intact to light touch    Palpation: Increased tone and tenderness noted with palpation to: Middle traps    Posture:  Pt presents with postural abnormalities which include: Large Kyphosis with  sway back     Gait Analysis: The patient ambulated with the following assistive device: none; the pt presents with the following gait abnormalities: decreased step length, renu, and arm swing; increased forward flexed posture, trunk sway -     FUNCTION:     CMS Impairment/Limitation/Restriction for FOTO NDI  Survey    Therapist reviewed FOTO scores for Fercho Bill on 1/11/2024.   FOTO documents entered into [facility] - see Media section.    Limitation Score: 47%         TREATMENT     Total Treatment time separate from Evaluation: (15) minutes    Fercho received the treatments listed below:      THERAPEUTIC EXERCISES: to develop strength, endurance, ROM, flexibility, posture and core stabilization for (15)  minutes including: x = performed today    TherEx 1/11/2024    Prone on elbows     Bilateral External Rotation Green TheraBand   Hor Abduction Green TheraBand      Thoracic Rotations      Thoracic Extensions      BOLD = new this visit  Plan for Next Visit:     Upper Body Ergometer     Seated thoracic extensions  Bilateral External Rotation Green TheraBand   Hor Abduction Green TheraBand     Posterior pelvic tilt   Bridges   Open Books   Quad Thread the Needle     Cable Column Rows  Cable Column Chops   Palloff Presses     Precor Torso Rotation         PATIENT EDUCATION AND HOME EXERCISES     Education/Self-Care provided:  (included in treatment) minutes   Patient educated on the impairments noted above and the effects of physical therapy intervention to improve overall condition and QOL.   Patient was educated on all the above exercise prior/during/after for proper posture, positioning, and execution for safe performance with home exercise program.   Exercise/Activity modifications:   1/11/2024: EVAL:     Written Home Exercises Provided: yes. Prefers: Electronic Copies  Exercises were reviewed and Fercho was able to demonstrate them prior to the end of the session.  Fercho demonstrated good understanding of the education  provided. See EMR under Patient Instructions for exercises provided during therapy sessions.    ASSESSMENT     Fercho is a 23 y.o. male referred to outpatient Physical Therapy with a medical diagnosis of thoracic pain.  Fercho presents with clinical signs and symptoms that support this diagnosis with . decreased Cervical ROM, decreased upper extremity strength, Cervical joint(s) hypomobility, upper extremity neural tension, and impaired functional mobility.   decreased shoulder girdle ROM, decreased scapular and shoulder strength, Glenohumeral joint hypomobility, and impaired functional mobility.      The above impairments will be addressed through manual therapy techniques, therapeutic exercises, functional training, and modalities as necessary. Patient was treated and educated on exercises for home program, progression of therapy, and benefits of therapy to achieve full functional mobility.     Pt prognosis is Good.   Pt will benefit from skilled outpatient Physical Therapy to address the deficits stated above and in the chart below, provide pt/family education, and to maximize pt's level of independence.     Plan of care discussed with patient: Yes  Pt's spiritual, cultural and educational needs considered and patient is agreeable to the plan of care and goals as stated below:     Anticipated Barriers for therapy: chronicity of condition  Medical Necessity is demonstrated by the following  History  Co-morbidities and personal factors that may impact the plan of care [x] LOW: no personal factors / co-morbidities  [] MODERATE: 1-2 personal factors / co-morbidities  [] HIGH: 3+ personal factors / co-morbidities    Moderate / High Support Documentation:      Examination  Body Structures and Functions, activity limitations and participation restrictions that may impact the plan of care [x] LOW: addressing 1-2 elements  [] MODERATE: 3+ elements  [] HIGH: 4+ elements (please support below)    Moderate / High Support  Documentation:      Clinical Presentation [x] LOW: stable  [] MODERATE: Evolving  [] HIGH: Unstable     Decision Making/ Complexity Score: low         GOALS:  SHORT TERM GOALS:  3 weeks  Progress Date met   Recent signs and systems trend is improving in order to progress towards LTG's. [] Met  [] Not Met  [x] Progressing     Patient will be independent with HEP in order to further progress and return to maximal function. [] Met  [] Not Met  [x] Progressing     Pain rating at Worst: 5/10 in order to progress towards increased independence with activity. [] Met  [] Not Met  [x] Progressing     Patient will be able to correct postural deviations in sitting and standing, to decrease pain and promote postural awareness for injury prevention.  [] Met  [] Not Met  [x] Progressing       LONG TERM GOALS: 6 weeks  Progress Date met   Patient will return to normal ADL, recreational, and work related activities with less pain and limitation.  [] Met  [] Not Met  [x] Progressing     Patient will improve AROM to stated goals in order to return to maximal functional potential.  [] Met  [] Not Met  [x] Progressing     Patient will improve Strength to stated goals of appropriate musculature in order to improve functional independence.  [] Met  [] Not Met  [x] Progressing     Pain Rating at Best: 1/10 to improve Quality of Life.  [] Met  [] Not Met  [x] Progressing     Patient will meet predicted functional outcome (FOTO) score: 27% to increase self-worth & perceived functional ability. [] Met  [] Not Met  [x] Progressing     Patient will have met/partially met personal goal of: no mid back pain with activity [] Met  [] Not Met  [x] Progressing      [] Met  [] Not Met  [] Progressing        PLAN   Plan of care Certification: 1/11/2024 to 2/28/24    Outpatient Physical Therapy 1 times weekly for 6 weeks to include any combination of the following interventions: virtual visits, dry needling, modalities, electrical stimulation (IFC,  Pre-Mod, Attended with Functional Dry Needling), Manual Therapy, Moist Heat/ Ice, Neuromuscular Re-ed, Patient Education, Self Care, Therapeutic Exercise, Functional Training, and Therapeutic Activites     Thank you for this referral.    Rick Hoff, PT      I CERTIFY THE NEED FOR THESE SERVICES FURNISHED UNDER THIS PLAN OF TREATMENT AND WHILE UNDER MY CARE   Physician's comments:     Physician's Signature: ___________________________________________________

## 2024-01-15 PROBLEM — M54.6 CHRONIC BILATERAL THORACIC BACK PAIN: Status: ACTIVE | Noted: 2024-01-15

## 2024-01-15 PROBLEM — G89.29 CHRONIC BILATERAL THORACIC BACK PAIN: Status: ACTIVE | Noted: 2024-01-15

## 2024-01-15 PROBLEM — M53.84 DECREASED RANGE OF MOTION OF THORACIC SPINE: Status: ACTIVE | Noted: 2024-01-15

## 2024-01-19 ENCOUNTER — CLINICAL SUPPORT (OUTPATIENT)
Dept: REHABILITATION | Facility: HOSPITAL | Age: 24
End: 2024-01-19
Payer: MEDICAID

## 2024-01-19 DIAGNOSIS — M54.6 CHRONIC BILATERAL THORACIC BACK PAIN: Primary | ICD-10-CM

## 2024-01-19 DIAGNOSIS — G89.29 CHRONIC BILATERAL THORACIC BACK PAIN: Primary | ICD-10-CM

## 2024-01-19 DIAGNOSIS — M53.84 DECREASED RANGE OF MOTION OF THORACIC SPINE: ICD-10-CM

## 2024-01-19 PROCEDURE — 97110 THERAPEUTIC EXERCISES: CPT | Mod: PN

## 2024-01-19 NOTE — PROGRESS NOTES
[facility]  Outpatient Physical Therapy Daily Treatment Note      Name: Fercho Bill  Clinic Number: [id number]  Visit Date: 1/19/2024    Therapy Diagnosis:   Encounter Diagnoses   Name Primary?    Chronic bilateral thoracic back pain Yes    Decreased range of motion of thoracic spine        Physician: Wes Rojas PA*  Physician Orders: PT Eval and Treat  Medical Diagnosis from Referral: S16.1XXA (ICD-10-CM) - Strain of neck muscle, initial encounter   Evaluation Date: 1/11/2024  Authorization Period Expiration: 12/31/24  Plan of Care Expiration: 2/28/24                    Progress Update: 2/14/24                   Visit # / Visits authorized: 1 / 6                      FOTO: Visit #1 - 1/3     PTA Visit #: 0/5     FOTO Due Visit 5 -  Follow up  FOTO Due Visit 10 - Follow up    Time In: 1230  Time Out: 123  Total Billable Timed: 53 minutes  Total Billable Un-timed: 0 minutes    Precautions: Standard    Subjective     The patient presents to therapy slept 13 hours back is sore    Response to previous treatment: eval   Functional change: none so far     Pain: 3/10  Location: bilateral back  and trunk     Objective       Fercho received therapeutic exercises to develop strength, endurance, ROM, flexibility, posture, and core stabilization for 53 minutes including:    Upper Body Ergometer 3/3     Seated thoracic extensions over 1/2 roll x 20   Bilateral External Rotation Green TheraBand x 20   Hor Abduction Green TheraBand x 20   Green TheraBand Archers x 20 each      Posterior pelvic tilt x 20   Bridges x 20   Open Books x 20 each   Prone W x 20   Prone T x 20   Child pose x 20   Quad Thread the Needle x 20 each      ADD  Cable Column Rows  Cable Column Chops   Cable Column Diagonal Lifts   Palloff Presses      Precor Torso Rotation      Patient Education and HEP     He was compliant with home exercise program.    Education provided:   - Do Home Exercise Program     Written Home  Exercises Provided: Patient instructed to cont prior HEP.  Exercises were reviewed and Fercho was able to demonstrate them prior to the end of the session.  Fercho demonstrated fair  understanding of the education provided.     See EMR under Patient Instructions for exercises provided prior visit.    Assessment     The patient With large kyphosis, Internal Rotation shoulders and sway back posture contributing to his mid and upper back pain.  Did have some discomfort with exercises today, but felt a little looser when leaving     Pt will continue to benefit from skilled outpatient physical therapy to address the deficits listed in the problem list box on initial evaluation, provide pt/family education and to maximize pt's level of independence in the home and community environment.     Fercho Is progressing well towards his goals.   Pt prognosis is Good.     Pt's spiritual, cultural and educational needs considered and pt agreeable to plan of care and goals.    Anticipated barriers to physical therapy: none    Goals:   GOALS:  SHORT TERM GOALS:  3 weeks  Progress Date met   Recent signs and systems trend is improving in order to progress towards LTG's. [] Met  [] Not Met  [x] Progressing     Patient will be independent with HEP in order to further progress and return to maximal function. [] Met  [] Not Met  [x] Progressing     Pain rating at Worst: 5/10 in order to progress towards increased independence with activity. [] Met  [] Not Met  [x] Progressing     Patient will be able to correct postural deviations in sitting and standing, to decrease pain and promote postural awareness for injury prevention.  [] Met  [] Not Met  [x] Progressing        LONG TERM GOALS: 6 weeks  Progress Date met   Patient will return to normal ADL, recreational, and work related activities with less pain and limitation.  [] Met  [] Not Met  [x] Progressing     Patient will improve AROM to stated goals in order to return to maximal functional  potential.  [] Met  [] Not Met  [x] Progressing     Patient will improve Strength to stated goals of appropriate musculature in order to improve functional independence.  [] Met  [] Not Met  [x] Progressing     Pain Rating at Best: 1/10 to improve Quality of Life.  [] Met  [] Not Met  [x] Progressing     Patient will meet predicted functional outcome (FOTO) score: 27% to increase self-worth & perceived functional ability. [] Met  [] Not Met  [x] Progressing     Patient will have met/partially met personal goal of: no mid back pain with activity [] Met  [] Not Met  [x] Progressing        Plan     Continue with the plan of care established per initial evaluation    Rick Hoff, PT

## 2024-01-24 ENCOUNTER — PATIENT MESSAGE (OUTPATIENT)
Dept: FAMILY MEDICINE | Facility: CLINIC | Age: 24
End: 2024-01-24
Payer: MEDICAID

## 2024-01-24 DIAGNOSIS — M54.6 CHRONIC BILATERAL THORACIC BACK PAIN: Primary | ICD-10-CM

## 2024-01-24 DIAGNOSIS — G89.29 CHRONIC BILATERAL THORACIC BACK PAIN: Primary | ICD-10-CM

## 2024-01-24 DIAGNOSIS — M53.84 DECREASED RANGE OF MOTION OF THORACIC SPINE: ICD-10-CM

## 2024-01-25 NOTE — TELEPHONE ENCOUNTER
We could have him further evaluated with ortho spine. I didn't see anything on his neck xrays done prior to PT but since he's getting worse and not responding at all to PT he may need MRI and further evaluation. If agreeable we can place referral to Dr. Cotter.

## 2024-02-07 DIAGNOSIS — F33.1 MODERATE EPISODE OF RECURRENT MAJOR DEPRESSIVE DISORDER: ICD-10-CM

## 2024-02-07 RX ORDER — ESCITALOPRAM OXALATE 10 MG/1
10 TABLET ORAL DAILY
Qty: 90 TABLET | Refills: 1 | Status: CANCELLED | OUTPATIENT
Start: 2024-02-07 | End: 2024-08-05

## 2024-03-25 ENCOUNTER — OFFICE VISIT (OUTPATIENT)
Dept: ORTHOPEDICS | Facility: CLINIC | Age: 24
End: 2024-03-25
Payer: MEDICAID

## 2024-03-25 VITALS — HEIGHT: 68 IN | BODY MASS INDEX: 22.88 KG/M2 | WEIGHT: 151 LBS

## 2024-03-25 DIAGNOSIS — G89.29 CHRONIC BILATERAL THORACIC BACK PAIN: ICD-10-CM

## 2024-03-25 DIAGNOSIS — M54.12 CERVICAL RADICULOPATHY: ICD-10-CM

## 2024-03-25 DIAGNOSIS — M54.6 CHRONIC BILATERAL THORACIC BACK PAIN: ICD-10-CM

## 2024-03-25 DIAGNOSIS — M40.204 KYPHOSIS OF THORACIC REGION, UNSPECIFIED KYPHOSIS TYPE: Primary | ICD-10-CM

## 2024-03-25 DIAGNOSIS — M50.90 CERVICAL DISC DISEASE: ICD-10-CM

## 2024-03-25 PROCEDURE — 1159F MED LIST DOCD IN RCRD: CPT | Mod: CPTII,S$GLB,, | Performed by: ORTHOPAEDIC SURGERY

## 2024-03-25 PROCEDURE — 1160F RVW MEDS BY RX/DR IN RCRD: CPT | Mod: CPTII,S$GLB,, | Performed by: ORTHOPAEDIC SURGERY

## 2024-03-25 PROCEDURE — 99203 OFFICE O/P NEW LOW 30 MIN: CPT | Mod: S$GLB,,, | Performed by: ORTHOPAEDIC SURGERY

## 2024-03-25 PROCEDURE — 3008F BODY MASS INDEX DOCD: CPT | Mod: CPTII,S$GLB,, | Performed by: ORTHOPAEDIC SURGERY

## 2024-03-25 NOTE — PROGRESS NOTES
Subjective:       Patient ID: Fercho Bill is a 24 y.o. male.    Chief Complaint: Pain of the Thoracic Spine (Chronic upper back pain for ten years ranges from constant aching to sharp pain and muscle spasm worse with excursion or at night)      History of Present Illness    Prior to meeting with the patient I reviewed the medical chart in [facility]. This included reviewing the previous progress notes from our office, review of the patient's last appointment with their primary care provider, review of any visits to the emergency room, and review of any pain management appointments or procedures.   24-year-old male, presents to clinic today as a new patient for evaluation of complaints of chronic neck and back pain.  Patient estimates proximally 10 year duration of chronic neck and upper back pain with no inciting event or trauma.  States that he used to be in the marching band in high school, he carried a trumpet.  He had periods of back pain associated with that activity.  He has progressed now to chronic daily aching back pain with debilitating migrainous type headaches.  He denies upper extremity symptoms are radiculitis.    Current Medications  Current Outpatient Medications   Medication Sig Dispense Refill    cetirizine (ZYRTEC) 10 MG tablet Take 10 mg by mouth once daily.      EScitalopram oxalate (LEXAPRO) 10 MG tablet Take 1 tablet (10 mg total) by mouth once daily. 90 tablet 1     No current facility-administered medications for this visit.       Allergies  Review of patient's allergies indicates:   Allergen Reactions    Amoxicillin Hives       Past Medical History  Past Medical History:   Diagnosis Date    Depression     Hives     Persistent indigestion        Surgical History  Past Surgical History:   Procedure Laterality Date    APPENDECTOMY  11/28/2011       Family History:   Family History   Problem Relation Age of Onset    Mental illness Mother     Insomnia Mother     Anxiety disorder Mother     Depression  Mother     Breast cancer Mother     Heart disease Father     Schizophrenia Maternal Grandmother     Bipolar disorder Maternal Grandmother     Hypertension Maternal Grandfather     Alcohol abuse Maternal Grandfather     Alzheimer's disease Paternal Grandfather        Social History:   Social History     Socioeconomic History    Marital status: Single   Occupational History     Comment: [employer]   Tobacco Use    Smoking status: Never     Passive exposure: Never    Smokeless tobacco: Never   Substance and Sexual Activity    Alcohol use: Yes     Alcohol/week: 1.0 standard drink of alcohol     Types: 1 Shots of liquor per week     Comment: per month    Drug use: Yes     Frequency: 20.0 times per week     Types: Marijuana     Comment: monthly    Sexual activity: Yes     Partners: Female     Social Determinants of Health     Financial Resource Strain: High Risk (1/8/2024)    Overall Financial Resource Strain (CARDIA)     Difficulty of Paying Living Expenses: Hard   Food Insecurity: Food Insecurity Present (1/8/2024)    Hunger Vital Sign     Worried About Running Out of Food in the Last Year: Sometimes true     Ran Out of Food in the Last Year: Patient declined   Transportation Needs: No Transportation Needs (1/8/2024)    PRAPARE - Transportation     Lack of Transportation (Medical): No     Lack of Transportation (Non-Medical): No   Physical Activity: Sufficiently Active (1/8/2024)    Exercise Vital Sign     Days of Exercise per Week: 5 days     Minutes of Exercise per Session: 120 min   Stress: Stress Concern Present (1/8/2024)    [place] [employer] - Occupational Stress Questionnaire     Feeling of Stress : Rather much   Social Connections: Unknown (1/8/2024)    Social Connection and Isolation Panel [NHANES]     Frequency of Communication with Friends and Family: Twice a week     Frequency of Social Gatherings with Friends and Family: Twice a week      Active Member of Clubs or Organizations: No     Attends Club or Organization Meetings: Patient declined     Marital Status: Never    Housing Stability: Low Risk  (1/8/2024)    Housing Stability Vital Sign     Unable to Pay for Housing in the Last Year: No     Number of Places Lived in the Last Year: 1     Unstable Housing in the Last Year: No       Hospitalization/Major Diagnostic Procedure:     Review of Systems     General/Constitutional:  Chills denies. Fatigue denies. Fever denies. Weight gain denies. Weight loss denies.    Respiratory:  Shortness of breath denies.    Cardiovascular:  Chest pain denies.    Gastrointestinal:  Constipation denies. Diarrhea denies. Nausea denies. Vomiting denies.     Hematology:  Easy bruising denies. Prolonged bleeding denies.     Genitourinary:  Frequent urination denies. Pain in lower back denies. Painful urination denies.     Musculoskeletal:  See HPI for details    Skin:  Rash denies.    Neurologic:  Dizziness denies. Gait abnormalities denies. Seizures denies. Tingling/Numbess denies.    Psychiatric:  Anxiety denies. Depressed mood denies.     Objective:   Vital Signs: There were no vitals filed for this visit.     Physical Exam      General Examination:     Constitutional: The patient is alert and oriented to lace person and time. Mood is pleasant.     Head/Face: Normal facial features normal eyebrows    Eyes: Normal extraocular motion bilaterally    Lungs: Respirations are equal and unlabored    Gait is coordinated.    Cardiovascular: There are no swelling or varicosities present.    Lymphatic: Negative for adenopathy    Skin: Normal    Neurological: Level of consciousness normal. Oriented to place person and time and situation    Psychiatric: Oriented to time place person and situation    Examination of the cervical spine, skin is dry and intact, no erythema or ecchymosis, no signs symptoms of infection.  Range of motion is full in all planes.  Spurling sign is  negative.  Full range of motion of the bilateral upper extremities shoulders elbows wrists hands and fingers.  No subjective numbness and tingling symptoms.  In the thoracic spine, no significant vertebral or paravertebral tenderness is noted.  No limitations in range of motion.  Lumbar spine is full.    XRAY Report/ Interpretation :  AP and lateral views of the thoracic spine reviewed today, no acute osseous abnormalities.  Images of the cervical spine reviewed from earlier this year with some loss of the cervical lordosis, no obvious disc space height loss or facet arthropathy is appreciated.      Assessment:       1. Kyphosis of thoracic region, unspecified kyphosis type    2. Chronic bilateral thoracic back pain    3. Decreased range of motion of thoracic spine    4. Cervical disc disease        Plan:       Fercho was seen today for pain.    Diagnoses and all orders for this visit:    Kyphosis of thoracic region, unspecified kyphosis type    Chronic bilateral thoracic back pain  -     X-Ray Thoracic Spine AP Lateral  -     Ambulatory referral/consult to Orthopedics    Decreased range of motion of thoracic spine  -     X-Ray Thoracic Spine AP Lateral  -     Ambulatory referral/consult to Orthopedics    Cervical disc disease         Follow up for 2 week f/u - cervical & thoracic MRI results.  This is to attest that the physician's assistant Efrain Nugent served in the capacity as a scribe for this patient's encounter.  This is also to verify that I have reviewed the patient's history and helped formulate the treatment plan as discussed in the report this treatment plan was discussed with the physician assistant.  It is outlined below    24-year-old male with chronic neck and back pain.  He is tried physical therapy, he completed a course of physical therapy only 2 exacerbate his symptoms.  He has some loss of the cervical lordosis on his x-rays but no significant disc degeneration or facet arthropathy is noted.   He has some mildly exaggerated kyphosis of the thoracic spine but again this is not severe.  We have ordered MRIs of his cervical and thoracic spine to better evaluate the intervertebral discs in the facet joints.  Suspect he may have some discogenic disease which is contributing to the referred pain in his head (headaches) and his upper back.    Treatment options were discussed with regards to the nature of the medical condition. Conservative pain intervention and surgical options were discussed in detail. The probability of success of each separate treatment option was discussed. The patient expressed a clear understanding of the treatment options. With regards to surgery, the procedure risk, benefits, complications, and outcomes were discussed. No guarantees were given with regards to surgical outcome.   The risk of complications, morbidity, and mortality of patient management decisions have been made at the time of this visit. These are associated with the patient's problems, diagnostic procedures and treatment options. This includes the possible management options selected and those considered but not selected by the patient after shared medical decision making we discussed with the patient.   This note was created using Dragon voice recognition software that occasionally misinterpreted phrases or words.

## 2024-04-15 ENCOUNTER — HOSPITAL ENCOUNTER (OUTPATIENT)
Dept: RADIOLOGY | Facility: HOSPITAL | Age: 24
Discharge: HOME | End: 2024-04-15
Attending: PHYSICIAN ASSISTANT
Payer: MEDICAID

## 2024-04-15 DIAGNOSIS — M40.204 KYPHOSIS OF THORACIC REGION, UNSPECIFIED KYPHOSIS TYPE: ICD-10-CM

## 2024-04-15 DIAGNOSIS — G89.29 CHRONIC BILATERAL THORACIC BACK PAIN: ICD-10-CM

## 2024-04-15 DIAGNOSIS — M54.12 CERVICAL RADICULOPATHY: ICD-10-CM

## 2024-04-15 DIAGNOSIS — M54.6 CHRONIC BILATERAL THORACIC BACK PAIN: ICD-10-CM

## 2024-04-15 DIAGNOSIS — M50.90 CERVICAL DISC DISEASE: ICD-10-CM

## 2024-04-15 PROCEDURE — 72141 MRI NECK SPINE W/O DYE: CPT | Mod: TC,PO

## 2024-04-15 PROCEDURE — 72146 MRI CHEST SPINE W/O DYE: CPT | Mod: TC,PO

## 2024-04-15 PROCEDURE — 72146 MRI CHEST SPINE W/O DYE: CPT | Mod: 26,,, | Performed by: RADIOLOGY

## 2024-04-15 PROCEDURE — 72141 MRI NECK SPINE W/O DYE: CPT | Mod: 26,,, | Performed by: RADIOLOGY

## 2024-04-24 ENCOUNTER — PATIENT MESSAGE (OUTPATIENT)
Dept: ORTHOPEDICS | Facility: CLINIC | Age: 24
End: 2024-04-24

## 2024-04-24 ENCOUNTER — OFFICE VISIT (OUTPATIENT)
Dept: ORTHOPEDICS | Facility: CLINIC | Age: 24
End: 2024-04-24
Payer: MEDICAID

## 2024-04-24 VITALS — WEIGHT: 150.69 LBS | HEIGHT: 68 IN | BODY MASS INDEX: 22.84 KG/M2

## 2024-04-24 DIAGNOSIS — G89.29 CHRONIC BILATERAL THORACIC BACK PAIN: ICD-10-CM

## 2024-04-24 DIAGNOSIS — M50.90 CERVICAL DISC DISEASE: Primary | ICD-10-CM

## 2024-04-24 DIAGNOSIS — M47.812 FACET ARTHRITIS OF CERVICAL REGION: ICD-10-CM

## 2024-04-24 DIAGNOSIS — M54.6 CHRONIC BILATERAL THORACIC BACK PAIN: ICD-10-CM

## 2024-04-24 DIAGNOSIS — M40.204 KYPHOSIS OF THORACIC REGION, UNSPECIFIED KYPHOSIS TYPE: ICD-10-CM

## 2024-04-24 PROCEDURE — 99213 OFFICE O/P EST LOW 20 MIN: CPT | Mod: S$GLB,,, | Performed by: ORTHOPAEDIC SURGERY

## 2024-04-24 PROCEDURE — 3008F BODY MASS INDEX DOCD: CPT | Mod: CPTII,S$GLB,, | Performed by: ORTHOPAEDIC SURGERY

## 2024-04-24 PROCEDURE — 1159F MED LIST DOCD IN RCRD: CPT | Mod: CPTII,S$GLB,, | Performed by: ORTHOPAEDIC SURGERY

## 2024-04-24 PROCEDURE — 1160F RVW MEDS BY RX/DR IN RCRD: CPT | Mod: CPTII,S$GLB,, | Performed by: ORTHOPAEDIC SURGERY

## 2024-04-24 RX ORDER — OMEPRAZOLE 20 MG/1
20 TABLET, DELAYED RELEASE ORAL DAILY
COMMUNITY

## 2024-04-24 NOTE — PROGRESS NOTES
Subjective:       Patient ID: Fercho Bill is a 24 y.o. male.    Chief Complaint: Pain of the Spine (Patient is here for MRI results of his Cervical and Thoracic spine, states his pain is the same as his last visit, was increased this morning, pain on both sides but Right is worse on T-Spine, Cervical pain is the same as his last visit, has Hx of Migraines that have increased in intensity 10 out of 10 ) and Pain of the Neck      History of Present Illness    Prior to meeting with the patient I reviewed the medical chart in [facility]. This included reviewing the previous progress notes from our office, review of the patient's last appointment with their primary care provider, review of any visits to the emergency room, and review of any pain management appointments or procedures.   Follow-up of chronic thoracic right-sided pain and cervical pain.  No radiation he discuss MRI results chronic thoracic and cervical pain has been present for 10 years    Current Medications  Current Outpatient Medications   Medication Sig Dispense Refill    cetirizine (ZYRTEC) 10 MG tablet Take 10 mg by mouth once daily.      EScitalopram oxalate (LEXAPRO) 10 MG tablet Take 1 tablet (10 mg total) by mouth once daily. 90 tablet 1    omeprazole (PRILOSEC OTC) 20 MG tablet Take 20 mg by mouth once daily.       No current facility-administered medications for this visit.       Allergies  Review of patient's allergies indicates:   Allergen Reactions    Amoxicillin Hives       Past Medical History  Past Medical History:   Diagnosis Date    Depression     Hives     Persistent indigestion        Surgical History  Past Surgical History:   Procedure Laterality Date    APPENDECTOMY  11/28/2011       Family History:   Family History   Problem Relation Name Age of Onset    Mental illness Mother      Insomnia Mother      Anxiety disorder Mother      Depression Mother      Breast cancer Mother      Heart disease Father      Schizophrenia Maternal Grandmother       Bipolar disorder Maternal Grandmother      Hypertension Maternal Grandfather      Alcohol abuse Maternal Grandfather      Alzheimer's disease Paternal Grandfather         Social History:   Social History     Socioeconomic History    Marital status: Single   Occupational History     Comment: [employer]   Tobacco Use    Smoking status: Never     Passive exposure: Never    Smokeless tobacco: Never   Substance and Sexual Activity    Alcohol use: Yes     Alcohol/week: 1.0 standard drink of alcohol     Types: 1 Shots of liquor per week     Comment: per month    Drug use: Yes     Frequency: 20.0 times per week     Types: Marijuana     Comment: monthly    Sexual activity: Yes     Partners: Female     Social Determinants of Health     Financial Resource Strain: High Risk (1/8/2024)    Overall Financial Resource Strain (CARDIA)     Difficulty of Paying Living Expenses: Hard   Food Insecurity: Food Insecurity Present (1/8/2024)    Hunger Vital Sign     Worried About Running Out of Food in the Last Year: Sometimes true     Ran Out of Food in the Last Year: Patient declined   Transportation Needs: No Transportation Needs (1/8/2024)    PRAPARE - Transportation     Lack of Transportation (Medical): No     Lack of Transportation (Non-Medical): No   Physical Activity: Sufficiently Active (1/8/2024)    Exercise Vital Sign     Days of Exercise per Week: 5 days     Minutes of Exercise per Session: 120 min   Stress: Stress Concern Present (1/8/2024)    [place] [employer] - Occupational Stress Questionnaire     Feeling of Stress : Rather much   Social Connections: Unknown (1/8/2024)    Social Connection and Isolation Panel [NHANES]     Frequency of Communication with Friends and Family: Twice a week     Frequency of Social Gatherings with Friends and Family: Twice a week     Active Member of Clubs or Organizations: No     Attends Club or Organization Meetings:  Patient declined     Marital Status: Never    Housing Stability: Low Risk  (1/8/2024)    Housing Stability Vital Sign     Unable to Pay for Housing in the Last Year: No     Number of Places Lived in the Last Year: 1     Unstable Housing in the Last Year: No       Hospitalization/Major Diagnostic Procedure:     Review of Systems     General/Constitutional:  Chills denies. Fatigue denies. Fever denies. Weight gain denies. Weight loss denies.    Respiratory:  Shortness of breath denies.    Cardiovascular:  Chest pain denies.    Gastrointestinal:  Constipation denies. Diarrhea denies. Nausea denies. Vomiting denies.     Hematology:  Easy bruising denies. Prolonged bleeding denies.     Genitourinary:  Frequent urination denies. Pain in lower back denies. Painful urination denies.     Musculoskeletal:  See HPI for details    Skin:  Rash denies.    Neurologic:  Dizziness denies. Gait abnormalities denies. Seizures denies. Tingling/Numbess denies.    Psychiatric:  Anxiety denies. Depressed mood denies.     Objective:   Vital Signs: There were no vitals filed for this visit.     Physical Exam      General Examination:     Constitutional: The patient is alert and oriented to lace person and time. Mood is pleasant.     Head/Face: Normal facial features normal eyebrows    Eyes: Normal extraocular motion bilaterally    Lungs: Respirations are equal and unlabored    Gait is coordinated.    Cardiovascular: There are no swelling or varicosities present.    Lymphatic: Negative for adenopathy    Skin: Normal    Neurological: Level of consciousness normal. Oriented to place person and time and situation    Psychiatric: Oriented to time place person and situation    Moderate tenderness midthoracic area right greater left in the parascapular muscles  XRAY Report/ Interpretation:  Cervical MRI and lumbar thoracic MRI results were personally reviewed please see report for details no profound symptoms or findings other than a disc  protrusion at T7-8.    MRI Cervical Spine Without Contrast  Order: [id number]  Status: Final result       Visible to patient: Yes (seen)       Next appt: 07/08/2024 at 08:40 AM in Family Medicine (Wes Rojas PA-C)       Dx: Cervical radiculopathy; Cervical disc...    0 Result Notes  Details    Reading Physician Reading Date Result Priority   Cabrera Lawson MD  [phone] 4/15/2024 Routine     Narrative & Impression  CLINICAL HISTORY:  ([id number])25 y/o  (2000) M     cervical radiculopathy; Cervical disc disorder, unspecified, unspecified cervical region     TECHNIQUE:  (A#[id number], exam time 4/15/2024 8:23)     MRI CERVICAL SPINE WITHOUT CONTRAST [id number]     Multiplanar multisequence MRI of the cervical spine.     CONTRAST:     No contrast was administered.     COMPARISON:  Radiograph from 01/08/2024     FINDINGS:  The prevertebral soft tissues are normal. Alignment is anatomic. Individual vertebral height is maintained. Marrow signal is within normal limits. The spinal cord and cervicomedullary junction are within normal limits.     At C2-3, the disc demonstrates normal height, signal intensity and posterior contour. The uncovertebral joints are normal. There is no spinal canal stenosis.  There is no neural foraminal stenosis.  There is no facet arthropathy.     At C3-4, the GB shows relative normal disc height with slightly decreased signal intensity.  There is minor bulging of the posterior annulus with minimal facet arthropathy.  No vertebral hypertrophy is present in the spinal canal/neural foramina appear patent.     At C4-5, the disc shows relative normal disc height and signal intensity with moderate bulging of the posterior annulus at the level of the neural foramina.  There is mild right facet arthropathy.  There is a tiny left uncovertebral osteophytes.  This results in very mild left neural foraminal stenosis without spinal canal or right neural foraminal stenosis.     At C5-6, the  day shows mild disc height loss with a small broad-based posterior disc bulge.  There is a tiny superimposed left paracentral/lateral disc protrusion (sagittal image 8 pain).  There is no facet arthropathy or uncovertebral hypertrophy.  This results in very mild left neural foraminal stenosis without spinal canal or right neural foraminal stenosis.     At C6-7, the tissues relative normal disc height and signal intensity.  There is a normal posterior contour with no facet arthropathy or uncovertebral hypertrophy.  The spinal canal and neural foramina appear patent.     At C7-T1, the disc demonstrates normal height, signal intensity and posterior contour. The uncovertebral joints are normal. There is no spinal canal stenosis.  There is no neural foraminal stenosis.  There is no facet arthropathy.     Impression:     1.  No acute osseous abnormality in the cervical spine.     2.  Minor degenerative change as outlined above most pronounced at C5-C6 and C4-C5.        Electronically signed by:Cabrera Lawson  Date:                                            04/15/2024  Time:                                           09:00           Exam Ended: 04/15/24 08:22 CDT Last Resulted: 04/15/24 09:00 CDT       Order Details        View Encounter        Lab and Collection Details        Routing                     Efrain Nugent PA on 4/15/2024 08:31     MRI Thoracic Spine Without Contrast  Order: [id number]  Status: Final result       Visible to patient: Yes (seen)       Next appt: 07/08/2024 at 08:40 AM in Family Medicine (Wes Rojas PA-C)       Dx: Kyphosis of thoracic region, unspecif...    0 Result Notes  Details    Reading Physician Reading Date Result Priority   Maciej Naranjo MD  [phone] 4/15/2024 Routine     Narrative & Impression  EXAMINATION:  MRI THORACIC SPINE WITHOUT CONTRAST     CLINICAL HISTORY:  thoracic pain; Pain in thoracic spine     TECHNIQUE:  Multisequence, multiplanar imaging of the  thoracic spine obtained without IV contrast.     COMPARISON:  Thoracic spine radiography 03/25/2024     FINDINGS:  Thoracic spine alignment is within normal limits.  Thoracic vertebral body heights are maintained.  T2/stir hyperintense lesion in the T8 vertebral body is isointense on T1 imaging, likely representing an atypical intraosseous hemangioma.  No other bone marrow signal abnormality.     Normal morphology and signal intensity of the thoracic spinal cord.  Paraspinal muscles are within normal limits.  Limited imaging through the chest and retroperitoneum shows no significant abnormality.     T1-2 and T2-3: Unremarkable.     T3-4: Right paracentral disc protrusion, minimally narrowing the rightward spinal canal.     T4-5: Small central disc protrusion which does not significantly narrow the spinal canal.     T5-6: Small central disc protrusion which does not significantly narrow the spinal canal.     T6-7: Small central/right paracentral disc protrusion which does not significantly narrow the spinal canal.     T7-8 right paracentral disc protrusion which minimally narrows the right spinal canal.     T8-9: Very small Schmorl's nodes inferior T8 and superior T9.     T9-10: Unremarkable.     T10-11: Small Schmorl's nodes.     T11-12: Unremarkable.     No neural foramen narrowing at any level.     Impression:     Multiple small thoracic disc protrusions as outlined above, most pronounced at T3-4 and T7-8.     No acute osseous abnormality involving the thoracic spine.        Electronically signed by:Maciej Naranjo  Date:                                            04/15/2024  Time:                                           08:24           Exam Ended: 04/15/24 07:49             Assessment:       1. Cervical disc disease    2. Kyphosis of thoracic region, unspecified kyphosis type    3. Chronic bilateral thoracic back pain    4. Facet arthritis of cervical region        Plan:       Fercho was seen today for pain and  pain.    Diagnoses and all orders for this visit:    Cervical disc disease  -     Ambulatory referral/consult to Pain Clinic; Future    Kyphosis of thoracic region, unspecified kyphosis type  -     Ambulatory referral/consult to Pain Clinic; Future    Chronic bilateral thoracic back pain  -     Ambulatory referral/consult to Pain Clinic; Future    Facet arthritis of cervical region         Follow up if symptoms worsen or fail to improve.    This patient is not a surgical candidate therefore I am referring him the pain management see if he is a candidate for some injections of the thoracic pain I assume his pain is on the basis of his excessive kyphosis of the thoracic spine and perhaps the T7-8 disc bulge or protrusion I have explained to him that I do not think he should undergo any type of reconstructive surgery as the magnitude of the surgery is quite significant.  Treatment options were discussed with regards to the nature of the medical condition. Conservative pain intervention and surgical options were discussed in detail. The probability of success of each separate treatment option was discussed. The patient expressed a clear understanding of the treatment options. With regards to surgery, the procedure risk, benefits, complications, and outcomes were discussed. No guarantees were given with regards to surgical outcome.   The risk of complications, morbidity, and mortality of patient management decisions have been made at the time of this visit. These are associated with the patient's problems, diagnostic procedures and treatment options. This includes the possible management options selected and those considered but not selected by the patient after shared medical decision making we discussed with the patient.     This note was created using Dragon voice recognition software that occasionally misinterpreted phrases or words.

## 2024-05-21 ENCOUNTER — PATIENT MESSAGE (OUTPATIENT)
Dept: ORTHOPEDICS | Facility: CLINIC | Age: 24
End: 2024-05-21

## 2024-05-24 DIAGNOSIS — F33.1 MODERATE EPISODE OF RECURRENT MAJOR DEPRESSIVE DISORDER: ICD-10-CM

## 2024-05-27 RX ORDER — ESCITALOPRAM OXALATE 10 MG/1
10 TABLET ORAL DAILY
Qty: 30 TABLET | Refills: 0 | Status: SHIPPED | OUTPATIENT
Start: 2024-05-27

## 2024-06-30 DIAGNOSIS — F33.1 MODERATE EPISODE OF RECURRENT MAJOR DEPRESSIVE DISORDER: ICD-10-CM

## 2024-07-02 RX ORDER — ESCITALOPRAM OXALATE 10 MG/1
10 TABLET ORAL DAILY
Qty: 30 TABLET | Refills: 0 | Status: SHIPPED | OUTPATIENT
Start: 2024-07-02

## 2024-07-08 ENCOUNTER — OFFICE VISIT (OUTPATIENT)
Dept: FAMILY MEDICINE | Facility: CLINIC | Age: 24
End: 2024-07-08

## 2024-07-08 VITALS
WEIGHT: 156.69 LBS | SYSTOLIC BLOOD PRESSURE: 120 MMHG | OXYGEN SATURATION: 98 % | BODY MASS INDEX: 23.75 KG/M2 | HEART RATE: 80 BPM | HEIGHT: 68 IN | DIASTOLIC BLOOD PRESSURE: 60 MMHG

## 2024-07-08 DIAGNOSIS — Z13.31 POSITIVE DEPRESSION SCREENING: ICD-10-CM

## 2024-07-08 DIAGNOSIS — F33.1 MODERATE EPISODE OF RECURRENT MAJOR DEPRESSIVE DISORDER: Primary | ICD-10-CM

## 2024-07-08 PROCEDURE — 99213 OFFICE O/P EST LOW 20 MIN: CPT | Mod: S$GLB,,, | Performed by: PHYSICIAN ASSISTANT

## 2024-07-08 RX ORDER — BUPROPION HYDROCHLORIDE 75 MG/1
75 TABLET ORAL 2 TIMES DAILY
Qty: 60 TABLET | Refills: 5 | Status: SHIPPED | OUTPATIENT
Start: 2024-07-08 | End: 2025-01-04

## 2024-07-08 RX ORDER — ESCITALOPRAM OXALATE 10 MG/1
10 TABLET ORAL DAILY
Qty: 90 TABLET | Refills: 1 | Status: SHIPPED | OUTPATIENT
Start: 2024-07-08 | End: 2025-01-04

## 2024-07-08 NOTE — PROGRESS NOTES
SUBJECTIVE:    Patient ID: Fercho Bill is a 24 y.o. male.    Chief Complaint: Follow-up (Bottles brought//Pt is here for a check up///Pt would like to discuss his medication//FERNANDO )    23 yo male presents for regular checkup. Reports that he has been doing pretty well from a back standpoint. Knows that he needs to get in with the interventional pain management for thoracic back injections.    Depression is decently managed with lexapro but finding that he is more fatigued and difficulty getting going. Brain fog. Job going well. Good relationship. NO issues there.         No visits with results within 6 Month(s) from this visit.   Latest known visit with results is:   Office Visit on 11/28/2022   Component Date Value Ref Range Status    WBC 07/10/2023 5.0  3.8 - 10.8 Thousand/uL Final    RBC 07/10/2023 5.48  4.20 - 5.80 Million/uL Final    Hemoglobin 07/10/2023 16.8  13.2 - 17.1 g/dL Final    Hematocrit 07/10/2023 48.7  38.5 - 50.0 % Final    MCV 07/10/2023 88.9  80.0 - 100.0 fL Final    MCH 07/10/2023 30.7  27.0 - 33.0 pg Final    MCHC 07/10/2023 34.5  32.0 - 36.0 g/dL Final    RDW 07/10/2023 12.4  11.0 - 15.0 % Final    Platelets 07/10/2023 267  140 - 400 Thousand/uL Final    MPV 07/10/2023 10.2  7.5 - 12.5 fL Final    Neutrophils, Abs 07/10/2023 2,475  1,500 - 7,800 cells/uL Final    Lymph # 07/10/2023 1,855  850 - 3,900 cells/uL Final    Mono # 07/10/2023 440  200 - 950 cells/uL Final    Eos # 07/10/2023 170  15 - 500 cells/uL Final    Baso # 07/10/2023 60  0 - 200 cells/uL Final    Neutrophils Relative 07/10/2023 49.5  % Final    Lymph % 07/10/2023 37.1  % Final    Mono % 07/10/2023 8.8  % Final    Eosinophil % 07/10/2023 3.4  % Final    Basophil % 07/10/2023 1.2  % Final    Glucose 07/10/2023 89  65 - 99 mg/dL Final    BUN 07/10/2023 20  7 - 25 mg/dL Final    Creatinine 07/10/2023 1.04  0.60 - 1.24 mg/dL Final    eGFR 07/10/2023 103  > OR = 60 mL/min/1.73m2 Final    BUN/Creatinine Ratio 07/10/2023 NOT  APPLICABLE  6 - 22 (calc) Final    Sodium 07/10/2023 139  135 - 146 mmol/L Final    Potassium 07/10/2023 4.6  3.5 - 5.3 mmol/L Final    Chloride 07/10/2023 103  98 - 110 mmol/L Final    CO2 07/10/2023 29  20 - 32 mmol/L Final    Calcium 07/10/2023 9.5  8.6 - 10.3 mg/dL Final    Total Protein 07/10/2023 7.3  6.1 - 8.1 g/dL Final    Albumin 07/10/2023 4.5  3.6 - 5.1 g/dL Final    Globulin, Total 07/10/2023 2.8  1.9 - 3.7 g/dL (calc) Final    Albumin/Globulin Ratio 07/10/2023 1.6  1.0 - 2.5 (calc) Final    Total Bilirubin 07/10/2023 0.5  0.2 - 1.2 mg/dL Final    Alkaline Phosphatase 07/10/2023 59  36 - 130 U/L Final    AST 07/10/2023 13  10 - 40 U/L Final    ALT 07/10/2023 17  9 - 46 U/L Final    Cholesterol 07/10/2023 165  <200 mg/dL Final    HDL 07/10/2023 52  > OR = 40 mg/dL Final    Triglycerides 07/10/2023 69  <150 mg/dL Final    LDL Cholesterol 07/10/2023 97  mg/dL (calc) Final    HDL/Cholesterol Ratio 07/10/2023 3.2  <5.0 (calc) Final    Non HDL Chol. (LDL+VLDL) 07/10/2023 113  <130 mg/dL (calc) Final    TSH w/reflex to FT4 07/10/2023 1.69  0.40 - 4.50 mIU/L Final       Past Medical History:   Diagnosis Date    Depression     Hives     Persistent indigestion      Past Surgical History:   Procedure Laterality Date    APPENDECTOMY  11/28/2011     Family History   Problem Relation Name Age of Onset    Mental illness Mother Rebecca     Insomnia Mother Rebecca     Anxiety disorder Mother Rebecca     Depression Mother Rebecca     Breast cancer Mother Rebecca     Cancer Mother Rebecca     Vision loss Mother Rebecca     Heart disease Father Christopher     Schizophrenia Maternal Grandmother Joan     Bipolar disorder Maternal Grandmother Joan     Arthritis Maternal Grandmother Joan     Asthma Maternal Grandmother Joan     COPD Maternal Grandmother Joan     Drug abuse Maternal Grandmother Joan     Vision loss Maternal Grandmother Joan     Hypertension Maternal Grandfather Kristian     Alcohol abuse  Maternal Grandfather Kristian     Vision loss Maternal Grandfather Kristian     Alzheimer's disease Paternal Grandfather Cody     Early death Paternal Grandfather Cody     Hearing loss Paternal Grandfather Cody        Marital Status: Single  Alcohol History:  reports current alcohol use of about 1.0 standard drink of alcohol per week.  Tobacco History:  reports that he has been smoking cigars and cigarettes. He has never been exposed to tobacco smoke. He has never used smokeless tobacco.  Drug History:  reports current drug use. Frequency: 10.00 times per week. Drugs: Marijuana and Psilocybin.    Review of patient's allergies indicates:   Allergen Reactions    Amoxicillin Hives       Current Outpatient Medications:     buPROPion (WELLBUTRIN) 75 MG tablet, Take 1 tablet (75 mg total) by mouth 2 (two) times daily., Disp: 60 tablet, Rfl: 5    cetirizine (ZYRTEC) 10 MG tablet, Take 10 mg by mouth once daily., Disp: , Rfl:     EScitalopram oxalate (LEXAPRO) 10 MG tablet, Take 1 tablet (10 mg total) by mouth once daily., Disp: 90 tablet, Rfl: 1    omeprazole (PRILOSEC OTC) 20 MG tablet, Take 20 mg by mouth once daily., Disp: , Rfl:     Review of Systems   Constitutional:  Negative for activity change, fatigue, fever and unexpected weight change.   HENT:  Negative for congestion.    Respiratory:  Negative for apnea, cough, chest tightness and shortness of breath.    Cardiovascular:  Negative for chest pain and palpitations.   Gastrointestinal:  Negative for abdominal distention and abdominal pain.   Genitourinary:  Negative for difficulty urinating and dysuria.   Musculoskeletal:  Negative for arthralgias and back pain.   Neurological:  Negative for dizziness and weakness.   Psychiatric/Behavioral:  Positive for agitation, decreased concentration and dysphoric mood. Negative for sleep disturbance. The patient is nervous/anxious.           Objective:      Vitals:    07/08/24 0857   BP: 120/60   Pulse: 80   SpO2: 98%  "  Weight: 71.1 kg (156 lb 11.2 oz)   Height: 5' 8" (1.727 m)     Physical Exam  Constitutional:       General: He is not in acute distress.     Appearance: He is well-developed.   HENT:      Head: Normocephalic and atraumatic.   Eyes:      Pupils: Pupils are equal, round, and reactive to light.   Neck:      Thyroid: No thyromegaly.   Cardiovascular:      Rate and Rhythm: Normal rate and regular rhythm.      Heart sounds: Normal heart sounds.   Pulmonary:      Effort: Pulmonary effort is normal.      Breath sounds: Normal breath sounds.   Abdominal:      General: Bowel sounds are normal. There is no distension.      Palpations: Abdomen is soft.      Tenderness: There is no abdominal tenderness.   Musculoskeletal:         General: Normal range of motion.      Cervical back: Normal range of motion and neck supple.   Skin:     General: Skin is warm and dry.      Findings: No erythema or rash.   Neurological:      Mental Status: He is alert and oriented to person, place, and time.      Cranial Nerves: No cranial nerve deficit.           Assessment:       1. Moderate episode of recurrent major depressive disorder    2. Positive depression screening         Plan:       Moderate episode of recurrent major depressive disorder  Comments:  going to recommend starting wellbutrin now. low dose to titrate if needed. will update me in 1-2 weeks regarding efficacy.  Orders:  -     buPROPion (WELLBUTRIN) 75 MG tablet; Take 1 tablet (75 mg total) by mouth 2 (two) times daily.  Dispense: 60 tablet; Refill: 5  -     EScitalopram oxalate (LEXAPRO) 10 MG tablet; Take 1 tablet (10 mg total) by mouth once daily.  Dispense: 90 tablet; Refill: 1    Positive depression screening  Comments:  I have reviewed the positive depression score which warrants active treatment with psychotherapy and/or medications.    Follow up in about 3 months (around 10/8/2024).        7/8/2024 Wes Rojas PA-C      I have used clinical judgement based on " duration and functional status to consider definite necessity for treatment.

## 2024-08-01 ENCOUNTER — PATIENT MESSAGE (OUTPATIENT)
Dept: FAMILY MEDICINE | Facility: CLINIC | Age: 24
End: 2024-08-01

## 2024-08-01 DIAGNOSIS — F33.1 MODERATE EPISODE OF RECURRENT MAJOR DEPRESSIVE DISORDER: Primary | ICD-10-CM

## 2024-08-01 NOTE — TELEPHONE ENCOUNTER
I have a referral for Dr. Avila. She is a prescribing psychologist. She will also be able to get him set up for therapy. If he agrees I have the referral ready to sign. Just let me know

## 2024-09-13 ENCOUNTER — TELEPHONE (OUTPATIENT)
Dept: FAMILY MEDICINE | Facility: CLINIC | Age: 24
End: 2024-09-13

## 2024-09-13 DIAGNOSIS — Z00.00 ROUTINE PHYSICAL EXAMINATION: Primary | ICD-10-CM

## 2024-10-10 ENCOUNTER — OFFICE VISIT (OUTPATIENT)
Dept: FAMILY MEDICINE | Facility: CLINIC | Age: 24
End: 2024-10-10
Payer: COMMERCIAL

## 2024-10-10 VITALS
SYSTOLIC BLOOD PRESSURE: 110 MMHG | RESPIRATION RATE: 18 BRPM | HEIGHT: 68 IN | BODY MASS INDEX: 24.1 KG/M2 | HEART RATE: 89 BPM | DIASTOLIC BLOOD PRESSURE: 62 MMHG | OXYGEN SATURATION: 97 % | WEIGHT: 159 LBS

## 2024-10-10 DIAGNOSIS — Z23 FLU VACCINE NEED: ICD-10-CM

## 2024-10-10 DIAGNOSIS — F33.1 MODERATE EPISODE OF RECURRENT MAJOR DEPRESSIVE DISORDER: ICD-10-CM

## 2024-10-10 DIAGNOSIS — G89.29 CHRONIC BILATERAL THORACIC BACK PAIN: Primary | ICD-10-CM

## 2024-10-10 DIAGNOSIS — M54.6 CHRONIC BILATERAL THORACIC BACK PAIN: Primary | ICD-10-CM

## 2024-10-10 PROCEDURE — 3008F BODY MASS INDEX DOCD: CPT | Mod: CPTII,S$GLB,, | Performed by: PHYSICIAN ASSISTANT

## 2024-10-10 PROCEDURE — 1159F MED LIST DOCD IN RCRD: CPT | Mod: CPTII,S$GLB,, | Performed by: PHYSICIAN ASSISTANT

## 2024-10-10 PROCEDURE — 90656 IIV3 VACC NO PRSV 0.5 ML IM: CPT | Mod: S$GLB,,, | Performed by: PHYSICIAN ASSISTANT

## 2024-10-10 PROCEDURE — 90471 IMMUNIZATION ADMIN: CPT | Mod: S$GLB,,, | Performed by: PHYSICIAN ASSISTANT

## 2024-10-10 PROCEDURE — 3074F SYST BP LT 130 MM HG: CPT | Mod: CPTII,S$GLB,, | Performed by: PHYSICIAN ASSISTANT

## 2024-10-10 PROCEDURE — 99214 OFFICE O/P EST MOD 30 MIN: CPT | Mod: 25,S$GLB,, | Performed by: PHYSICIAN ASSISTANT

## 2024-10-10 PROCEDURE — 3078F DIAST BP <80 MM HG: CPT | Mod: CPTII,S$GLB,, | Performed by: PHYSICIAN ASSISTANT

## 2024-10-10 RX ORDER — ESCITALOPRAM OXALATE 10 MG/1
10 TABLET ORAL DAILY
Qty: 90 TABLET | Refills: 1 | Status: SHIPPED | OUTPATIENT
Start: 2024-10-10 | End: 2025-04-08

## 2024-10-10 NOTE — PROGRESS NOTES
SUBJECTIVE:    Patient ID: Fercho Bill is a 24 y.o. male.    Chief Complaint: Follow-up (No bottles//refills needed// pt states his back is still painful level 6 says he'll be getting a steroid injection in back next month 11/26//pt agrees to flu vaccine )    HPI:  Patient is a 24 year old male who presents to the clinic today for regular follow up. Patient still endorses upper back pain secondary to kyphosis and thoracic disc protrusions along T3-4 and T7-8 that was noted on MRI of thoracic spine. He follows with pain management and is undergoing steroid injections of the thoracic spine for symptomatic relief. His appointment is scheduled on 11/26. His pain limits his ability to be active in the gym. His diet has improved as he cut down on eating fast food and has been cooking more at home.     Patient stopped taking wellbutrin 2 weeks ago because of this tick that's been developing. His tick first started in the neck area while in the . He feels like his ticks have worsened while being on wellbutrin and feels like it got better since stopping. Patients mood has significantly improved since starting lexapro. The lexapro gave him a little more motivation to get things done. He moved out, is cooking more, and more social. He is interested in weaning off the the lexapro to see how he is able to function without it. He notes minor episodes of slurred speech while being on lexapro. He has not yet contacted a therapist, but is interested in doing so. [facility]    Patient endorses occasional ticks, disordered body movements, and mixing of speech. Will wean off antidepressants for now and see if symptoms get better.     Follow-up  Pertinent negatives include no abdominal pain, chest pain, congestion, fatigue, headaches or myalgias.         Past Medical History:   Diagnosis Date    Depression     Hives     Persistent indigestion      Past Surgical History:   Procedure  Laterality Date    APPENDECTOMY  11/28/2011     Family History   Problem Relation Name Age of Onset    Mental illness Mother Rebecca     Insomnia Mother Rebecca     Anxiety disorder Mother Rebecca     Depression Mother Rebecca     Breast cancer Mother Rebecca     Cancer Mother Rebecca     Vision loss Mother Rebecca     Heart disease Father Derek     Schizophrenia Maternal Grandmother Joan     Bipolar disorder Maternal Grandmother Joan     Arthritis Maternal Grandmother Jona     Asthma Maternal Grandmother Joan     COPD Maternal Grandmother Joan     Drug abuse Maternal Grandmother Joan     Vision loss Maternal Grandmother Joan     Hypertension Maternal Grandfather Kristian     Alcohol abuse Maternal Grandfather Kristian     Vision loss Maternal Grandfather Kristian     Alzheimer's disease Paternal Grandfather Cody     Early death Paternal Grandfather Cody     Hearing loss Paternal Grandfather Cody        Marital Status: Single  Alcohol History:  reports current alcohol use of about 1.0 standard drink of alcohol per week.  Tobacco History:  reports that he has been smoking cigars and cigarettes. He has never been exposed to tobacco smoke. He has never used smokeless tobacco.  Drug History:  reports current drug use. Frequency: 10.00 times per week. Drugs: Marijuana and Psilocybin.    Review of patient's allergies indicates:   Allergen Reactions    Amoxicillin Hives       Current Outpatient Medications:     cetirizine (ZYRTEC) 10 MG tablet, Take 10 mg by mouth once daily., Disp: , Rfl:     omeprazole (PRILOSEC OTC) 20 MG tablet, Take 20 mg by mouth once daily., Disp: , Rfl:     EScitalopram oxalate (LEXAPRO) 10 MG tablet, Take 1 tablet (10 mg total) by mouth once daily., Disp: 90 tablet, Rfl: 1    Current Facility-Administered Medications:     influenza (Flulaval, Fluzone, Fluarix) 45 mcg/0.5 mL IM vaccine (> or = 6 mo) 0.5 mL, 0.5 mL, Intramuscular, 1 time in Clinic/HOD, Wes Rojas,  "PA-C    Review of Systems   Constitutional:  Positive for activity change and appetite change. Negative for fatigue and unexpected weight change.   HENT:  Negative for congestion and rhinorrhea.    Eyes:  Negative for visual disturbance.   Respiratory:  Negative for chest tightness and shortness of breath.    Cardiovascular:  Negative for chest pain, palpitations and leg swelling.   Gastrointestinal:  Negative for abdominal pain, constipation and diarrhea.   Genitourinary:  Negative for dysuria, frequency, hematuria and urgency.   Musculoskeletal:  Positive for back pain. Negative for myalgias.   Neurological:  Negative for light-headedness and headaches.   Psychiatric/Behavioral:  Negative for dysphoric mood. The patient is not nervous/anxious.           Objective:      Vitals:    10/10/24 1539   BP: 110/62   Pulse: 89   Resp: 18   SpO2: 97%   Weight: 72.1 kg (159 lb)   Height: 5' 8" (1.727 m)     Physical Exam  Constitutional:       General: He is not in acute distress.     Appearance: Normal appearance. He is not ill-appearing.   HENT:      Head: Normocephalic and atraumatic.      Nose: Nose normal. No congestion or rhinorrhea.      Mouth/Throat:      Mouth: Mucous membranes are moist.      Pharynx: Oropharynx is clear. No oropharyngeal exudate or posterior oropharyngeal erythema.   Eyes:      General:         Right eye: No discharge.         Left eye: No discharge.      Conjunctiva/sclera: Conjunctivae normal.   Cardiovascular:      Rate and Rhythm: Normal rate and regular rhythm.      Pulses: Normal pulses.   Pulmonary:      Effort: Pulmonary effort is normal. No respiratory distress.      Breath sounds: Normal breath sounds. No stridor.   Skin:     General: Skin is warm and dry.      Coloration: Skin is not jaundiced or pale.   Neurological:      Mental Status: He is alert and oriented to person, place, and time.   Psychiatric:         Mood and Affect: Mood normal.           Assessment:       1. Chronic " bilateral thoracic back pain    2. Moderate episode of recurrent major depressive disorder    3. Flu vaccine need         Plan:       Chronic bilateral thoracic back pain  Comments:  follows with pain management (Dr. Doty); steroid shots scheduled 11/26. continue as is    Moderate episode of recurrent major depressive disorder  Comments:  Patient is off the wellbutrin. he is interested in weaning off the lexapro and start psycotherapy instead.   will start on lexapro 5mg daily instead of 10mg  Orders:  -     EScitalopram oxalate (LEXAPRO) 10 MG tablet; Take 1 tablet (10 mg total) by mouth once daily.  Dispense: 90 tablet; Refill: 1    Flu vaccine need  -     influenza (Flulaval, Fluzone, Fluarix) 45 mcg/0.5 mL IM vaccine (> or = 6 mo) 0.5 mL      Follow up in about 4 months (around 2/10/2025).        10/10/2024 Wes Rojas PA-C

## 2024-11-20 ENCOUNTER — PATIENT MESSAGE (OUTPATIENT)
Dept: FAMILY MEDICINE | Facility: CLINIC | Age: 24
End: 2024-11-20
Payer: COMMERCIAL

## 2024-11-20 DIAGNOSIS — F33.1 MODERATE EPISODE OF RECURRENT MAJOR DEPRESSIVE DISORDER: Primary | ICD-10-CM

## 2024-11-21 NOTE — TELEPHONE ENCOUNTER
I would like to get him in with psych to help manage any additional meds. Ino Reidl would be great for med management. Agree with the therapy to start asap

## 2024-12-09 ENCOUNTER — PATIENT MESSAGE (OUTPATIENT)
Dept: FAMILY MEDICINE | Facility: CLINIC | Age: 24
End: 2024-12-09
Payer: COMMERCIAL

## 2024-12-11 ENCOUNTER — OFFICE VISIT (OUTPATIENT)
Dept: URGENT CARE | Facility: CLINIC | Age: 24
End: 2024-12-11
Payer: COMMERCIAL

## 2024-12-11 VITALS
RESPIRATION RATE: 17 BRPM | HEIGHT: 68 IN | WEIGHT: 160 LBS | BODY MASS INDEX: 24.25 KG/M2 | TEMPERATURE: 101 F | HEART RATE: 106 BPM | DIASTOLIC BLOOD PRESSURE: 92 MMHG | SYSTOLIC BLOOD PRESSURE: 142 MMHG | OXYGEN SATURATION: 100 %

## 2024-12-11 DIAGNOSIS — R09.81 NASAL CONGESTION: ICD-10-CM

## 2024-12-11 DIAGNOSIS — J10.1 INFLUENZA A: Primary | ICD-10-CM

## 2024-12-11 DIAGNOSIS — R50.9 FEVER, UNSPECIFIED FEVER CAUSE: ICD-10-CM

## 2024-12-11 LAB
CTP QC/QA: YES
CTP QC/QA: YES
FLUAV AG NPH QL: POSITIVE
FLUBV AG NPH QL: NEGATIVE
SARS-COV-2 AG RESP QL IA.RAPID: NEGATIVE

## 2024-12-11 PROCEDURE — 87804 INFLUENZA ASSAY W/OPTIC: CPT | Mod: QW,,,

## 2024-12-11 PROCEDURE — 87811 SARS-COV-2 COVID19 W/OPTIC: CPT | Mod: QW,S$GLB,,

## 2024-12-11 PROCEDURE — 99214 OFFICE O/P EST MOD 30 MIN: CPT | Mod: 25,S$GLB,,

## 2024-12-11 RX ORDER — ACETAMINOPHEN 500 MG
1000 TABLET ORAL
Status: COMPLETED | OUTPATIENT
Start: 2024-12-11 | End: 2024-12-11

## 2024-12-11 RX ORDER — BALOXAVIR MARBOXIL 40 MG/1
40 TABLET, FILM COATED ORAL ONCE
Qty: 1 TABLET | Refills: 0 | Status: SHIPPED | OUTPATIENT
Start: 2024-12-11 | End: 2024-12-11

## 2024-12-11 RX ADMIN — Medication 1000 MG: at 04:12

## 2024-12-11 NOTE — PATIENT INSTRUCTIONS
Continue medications for symptomatic support.  You may need to call pharmacies see who has xofluza in stock.

## 2024-12-11 NOTE — PROGRESS NOTES
"Subjective:      Patient ID: Fercho Bill is a 24 y.o. male.    Vitals:  height is 5' 8" (1.727 m) and weight is 72.6 kg (160 lb). His oral temperature is 100.6 °F (38.1 °C) (abnormal). His blood pressure is 142/92 (abnormal) and his pulse is 106. His respiration is 17 and oxygen saturation is 100%.     Chief Complaint: URI    Subjective fever, headache, congestion, sore throat, nonproductive cough, and nausea that began yesterday.  Subjective fevers began this afternoon.  No known ill contacts.  Patient is vaccinated for influenza and COVID.  He has been using Sudafed and ibuprofen    URI   The current episode started yesterday. The problem has been unchanged. The maximum temperature recorded prior to his arrival was 100.4 - 100.9 F. The fever has been present for Less than 1 day. The cough is Non-productive. Associated symptoms include congestion, coughing, headaches, nausea and a sore throat. He has tried NSAIDs, decongestant and antihistamine (Ibuprofen, Sudafed, Zyrtec) for the symptoms. The treatment provided no relief.       Constitution: Positive for fever (Subjective).   HENT:  Positive for congestion, postnasal drip and sore throat.    Neck: neck negative. Positive for degenerative disc disease.   Cardiovascular: Negative.    Eyes: Negative.    Respiratory:  Positive for cough. Negative for sputum production.    Gastrointestinal:  Positive for nausea.   Endocrine: negative.   Genitourinary: Negative.    Musculoskeletal:  Positive for muscle cramps.   Skin: Negative.  Negative for erythema.   Allergic/Immunologic: Positive for immunizations up-to-date and flu shot.   Neurological:  Positive for headaches.   Hematologic/Lymphatic: Negative.    Psychiatric/Behavioral: Negative.        Objective:     Physical Exam   Constitutional: He is oriented to person, place, and time. He is cooperative.  Non-toxic appearance. He does not appear ill. No distress.   HENT:   Head: Normocephalic and atraumatic.   Ears: "   Right Ear: External ear normal.   Left Ear: External ear normal.   Nose: Nose normal.   Mouth/Throat: Uvula is midline, oropharynx is clear and moist and mucous membranes are normal. Mucous membranes are moist. Oropharynx is clear.   Eyes: Conjunctivae and lids are normal. Pupils are equal, round, and reactive to light. Extraocular movement intact   Neck: Trachea normal and phonation normal. Neck supple.   Cardiovascular: Regular rhythm, normal heart sounds and normal pulses. Tachycardia present.   Pulmonary/Chest: Effort normal and breath sounds normal.   Abdominal: Normal appearance. Soft. flat abdomen   Musculoskeletal: Normal range of motion.         General: Normal range of motion.   Lymphadenopathy:     He has no cervical adenopathy.   Neurological: no focal deficit. He is alert, oriented to person, place, and time and at baseline. He has normal motor skills, normal sensation and intact cranial nerves (2-12). Gait and coordination normal. GCS eye subscore is 4. GCS verbal subscore is 5. GCS motor subscore is 6.   Skin: Skin is warm, dry, not diaphoretic and no rash. Capillary refill takes 2 to 3 seconds. No erythema   Psychiatric: He experiences Normal attention and Normal perception. His speech is normal and behavior is normal. Mood, judgment and thought content normal.   Nursing note and vitals reviewed.      Assessment:     1. Influenza A    2. Nasal congestion    3. Fever, unspecified fever cause        Plan:       Influenza A  -     baloxavir marboxiL (XOFLUZA) 40 mg tablet; Take 1 tablet (40 mg total) by mouth once. for 1 dose  Dispense: 1 tablet; Refill: 0    Nasal congestion  -     SARS Coronavirus 2 Antigen, POCT Manual Read  -     POCT Influenza A/B Rapid Antigen    Fever, unspecified fever cause  -     acetaminophen tablet 1,000 mg        Patient is ill-appearing.  Febrile in clinic, and tachycardic.  He is nontoxic.  Did receive influenza vaccination this season.  Lungs are clear to auscultation.   Pox normal.  Differential considerations COVID-19, pneumonia, bronchitis, sinusitis

## 2024-12-11 NOTE — LETTER
December 11, 2024      [facility]  [address]  [facility] [address]  Phone: [phone]       Patient: Fercho Bill   YOB: 2000  Date of Visit: 12/11/2024    To Whom It May Concern:    Arias Bill  was at [facility] on 12/11/2024. The patient may return to work/school on 12/17/24 with no restrictions. If you have any questions or concerns, or if I can be of further assistance, please do not hesitate to contact me.  May return to work sooner if 24 hours fever free off of antipyretic medications    Sincerely,    YESI Jauregui

## 2024-12-16 ENCOUNTER — PATIENT MESSAGE (OUTPATIENT)
Dept: FAMILY MEDICINE | Facility: CLINIC | Age: 24
End: 2024-12-16
Payer: COMMERCIAL

## 2025-01-14 ENCOUNTER — TELEPHONE (OUTPATIENT)
Dept: PSYCHIATRY | Facility: CLINIC | Age: 25
End: 2025-01-14
Payer: COMMERCIAL

## 2025-01-14 NOTE — TELEPHONE ENCOUNTER
Rec'd call from pt requesting to schedule an appt with Ino Espino NP. Advised pt that he is located in the [facility]. Pt would prefer to be seen in the [place] [facility]. Advised pt that we have an extensive wait list and it will be several months to be scheduled for the initial visit. Pt verbalized understanding and states he wants to see if there is sooner availability in the [place] office. Please call pt to advise.

## 2025-02-24 ENCOUNTER — TELEPHONE (OUTPATIENT)
Dept: PSYCHIATRY | Facility: CLINIC | Age: 25
End: 2025-02-24
Payer: COMMERCIAL